# Patient Record
Sex: FEMALE | Race: WHITE | HISPANIC OR LATINO | Employment: PART TIME | URBAN - METROPOLITAN AREA
[De-identification: names, ages, dates, MRNs, and addresses within clinical notes are randomized per-mention and may not be internally consistent; named-entity substitution may affect disease eponyms.]

---

## 2018-11-02 ENCOUNTER — TRANSCRIBE ORDERS (OUTPATIENT)
Dept: LAB | Facility: CLINIC | Age: 23
End: 2018-11-02

## 2018-11-02 DIAGNOSIS — Z00.00 ROUTINE GENERAL MEDICAL EXAMINATION AT A HEALTH CARE FACILITY: Primary | ICD-10-CM

## 2019-07-26 ENCOUNTER — OFFICE VISIT (OUTPATIENT)
Dept: FAMILY MEDICINE CLINIC | Facility: CLINIC | Age: 24
End: 2019-07-26
Payer: COMMERCIAL

## 2019-07-26 VITALS
HEIGHT: 72 IN | RESPIRATION RATE: 14 BRPM | BODY MASS INDEX: 19.49 KG/M2 | HEART RATE: 76 BPM | TEMPERATURE: 98.2 F | OXYGEN SATURATION: 98 % | SYSTOLIC BLOOD PRESSURE: 128 MMHG | WEIGHT: 143.9 LBS | DIASTOLIC BLOOD PRESSURE: 78 MMHG

## 2019-07-26 DIAGNOSIS — Z23 NEED FOR TDAP VACCINATION: ICD-10-CM

## 2019-07-26 DIAGNOSIS — F90.9 ATTENTION DEFICIT HYPERACTIVITY DISORDER (ADHD), UNSPECIFIED ADHD TYPE: Primary | ICD-10-CM

## 2019-07-26 DIAGNOSIS — R11.0 NAUSEA: ICD-10-CM

## 2019-07-26 PROCEDURE — 99204 OFFICE O/P NEW MOD 45 MIN: CPT | Performed by: INTERNAL MEDICINE

## 2019-07-26 PROCEDURE — 4004F PT TOBACCO SCREEN RCVD TLK: CPT | Performed by: INTERNAL MEDICINE

## 2019-07-26 PROCEDURE — 3008F BODY MASS INDEX DOCD: CPT | Performed by: INTERNAL MEDICINE

## 2019-07-26 RX ORDER — ONDANSETRON 4 MG/1
4 TABLET, ORALLY DISINTEGRATING ORAL EVERY 6 HOURS PRN
Qty: 20 TABLET | Refills: 0 | Status: SHIPPED | OUTPATIENT
Start: 2019-07-26 | End: 2020-01-07 | Stop reason: SDUPTHER

## 2019-07-26 RX ORDER — CITALOPRAM 10 MG/1
TABLET ORAL
COMMUNITY
Start: 2019-04-24 | End: 2019-07-26

## 2019-07-26 RX ORDER — DEXTROAMPHETAMINE SACCHARATE, AMPHETAMINE ASPARTATE, DEXTROAMPHETAMINE SULFATE AND AMPHETAMINE SULFATE 2.5; 2.5; 2.5; 2.5 MG/1; MG/1; MG/1; MG/1
10 TABLET ORAL
COMMUNITY
Start: 2019-04-24 | End: 2020-01-07

## 2019-07-26 NOTE — PROGRESS NOTES
Assessment/Plan:         Diagnoses and all orders for this visit:    Attention deficit hyperactivity disorder (ADHD), unspecified ADHD type; Continue Adderall 10 mg bID with Food : breafast and 2 PM : 3 Rxs given for 3 mos, STAT ;  -     Comprehensive metabolic panel; Future  -     CBC and differential; Future  -     Hemoglobin A1C; Future  -     Magnesium; Future  -     Toxicology screen, urine  -     Lipid panel; Future  -     Thyroid Antibodies Panel; Future  -     T4, free; Future  -     TSH, 3rd generation; Future  -     Vitamin B12; Future  -     Vitamin D 25 hydroxy; Future  -     Urinalysis with reflex to microscopic  RTC in 3mos   Need for Tdap vaccination    Nausea; due to IBS and stress :  -     Comprehensive metabolic panel; Future  -     CBC and differential; Future  -     Hemoglobin A1C; Future  -     ondansetron (ZOFRAN-ODT) 4 mg disintegrating tablet; Take 1 tablet (4 mg total) by mouth every 6 (six) hours as needed for nausea or vomiting    Other orders  -     Cancel: TDAP VACCINE GREATER THAN OR EQUAL TO 8YO IM        Subjective:      Patient ID: Margo Suarez is a 20 y o  female  20 Y O lady is here for Annual Physical Exam, and Regular check up, No recent Blood work, she is doing very well on Adderall 10 mg BID, No Other issues except for On and Off IBS Symptoms and Nausea due to stress,     No Homicidal Nor Suicidal Ideas,    LMP;2 weeks ago    The following portions of the patient's history were reviewed and updated as appropriate: allergies, current medications, past family history, past medical history, past social history, past surgical history and problem list     Review of Systems   Constitutional: Negative for chills, fatigue and fever  HENT: Negative for congestion, facial swelling, sore throat, trouble swallowing and voice change  Eyes: Negative for pain, discharge and visual disturbance  Respiratory: Negative for cough, shortness of breath and wheezing  Cardiovascular: Negative for chest pain, palpitations and leg swelling  Gastrointestinal: Positive for nausea  Negative for abdominal pain, blood in stool, constipation and diarrhea  Endocrine: Negative for polydipsia, polyphagia and polyuria  Genitourinary: Negative for difficulty urinating, hematuria and urgency  Musculoskeletal: Negative for arthralgias and myalgias  Skin: Negative for rash  Neurological: Negative for dizziness, tremors, weakness and headaches  Hematological: Negative for adenopathy  Does not bruise/bleed easily  Psychiatric/Behavioral: Negative for dysphoric mood, sleep disturbance and suicidal ideas  Objective:      /78 (BP Location: Left arm, Patient Position: Sitting, Cuff Size: Standard)   Pulse 76   Temp 98 2 °F (36 8 °C) (Oral)   Resp 14   Ht 6' (1 829 m)   Wt 65 3 kg (143 lb 14 4 oz)   SpO2 98%   BMI 19 52 kg/m²          Physical Exam   Constitutional: She is oriented to person, place, and time  She appears well-nourished  No distress  HENT:   Head: Normocephalic  Mouth/Throat: Oropharynx is clear and moist  No oropharyngeal exudate  Eyes: Pupils are equal, round, and reactive to light  Conjunctivae are normal  No scleral icterus  Neck: Neck supple  No thyromegaly present  Cardiovascular: Normal rate, regular rhythm and normal heart sounds  No murmur heard  Pulmonary/Chest: Effort normal and breath sounds normal  No respiratory distress  She has no wheezes  She has no rales  Abdominal: Soft  Bowel sounds are normal  She exhibits no distension  There is no tenderness  There is no rebound and no guarding  Musculoskeletal: She exhibits no edema or tenderness  Lymphadenopathy:     She has no cervical adenopathy  Neurological: She is alert and oriented to person, place, and time  No cranial nerve deficit  Coordination normal    Skin: No erythema  Psychiatric: She has a normal mood and affect

## 2019-08-16 ENCOUNTER — APPOINTMENT (OUTPATIENT)
Dept: LAB | Facility: CLINIC | Age: 24
End: 2019-08-16
Payer: COMMERCIAL

## 2019-08-16 DIAGNOSIS — F90.9 ATTENTION DEFICIT HYPERACTIVITY DISORDER (ADHD), UNSPECIFIED ADHD TYPE: ICD-10-CM

## 2019-08-16 DIAGNOSIS — R11.0 NAUSEA: ICD-10-CM

## 2019-08-16 LAB
25(OH)D3 SERPL-MCNC: 24.2 NG/ML (ref 30–100)
ALBUMIN SERPL BCP-MCNC: 4.5 G/DL (ref 3.5–5)
ALP SERPL-CCNC: 38 U/L (ref 46–116)
ALT SERPL W P-5'-P-CCNC: 17 U/L (ref 12–78)
ANION GAP SERPL CALCULATED.3IONS-SCNC: 5 MMOL/L (ref 4–13)
AST SERPL W P-5'-P-CCNC: 9 U/L (ref 5–45)
BACTERIA UR QL AUTO: NORMAL /HPF
BASOPHILS # BLD AUTO: 0.04 THOUSANDS/ΜL (ref 0–0.1)
BASOPHILS NFR BLD AUTO: 1 % (ref 0–1)
BILIRUB SERPL-MCNC: 0.5 MG/DL (ref 0.2–1)
BILIRUB UR QL STRIP: NEGATIVE
BUN SERPL-MCNC: 15 MG/DL (ref 5–25)
CALCIUM SERPL-MCNC: 9 MG/DL (ref 8.3–10.1)
CHLORIDE SERPL-SCNC: 109 MMOL/L (ref 100–108)
CHOLEST SERPL-MCNC: 113 MG/DL (ref 50–200)
CLARITY UR: CLEAR
CO2 SERPL-SCNC: 29 MMOL/L (ref 21–32)
COLOR UR: YELLOW
CREAT SERPL-MCNC: 0.75 MG/DL (ref 0.6–1.3)
EOSINOPHIL # BLD AUTO: 0.29 THOUSAND/ΜL (ref 0–0.61)
EOSINOPHIL NFR BLD AUTO: 5 % (ref 0–6)
ERYTHROCYTE [DISTWIDTH] IN BLOOD BY AUTOMATED COUNT: 12.9 % (ref 11.6–15.1)
EST. AVERAGE GLUCOSE BLD GHB EST-MCNC: 97 MG/DL
GFR SERPL CREATININE-BSD FRML MDRD: 111 ML/MIN/1.73SQ M
GLUCOSE P FAST SERPL-MCNC: 80 MG/DL (ref 65–99)
GLUCOSE UR STRIP-MCNC: NEGATIVE MG/DL
HBA1C MFR BLD: 5 % (ref 4.2–6.3)
HCT VFR BLD AUTO: 40.4 % (ref 34.8–46.1)
HDLC SERPL-MCNC: 51 MG/DL (ref 40–60)
HGB BLD-MCNC: 12.9 G/DL (ref 11.5–15.4)
HGB UR QL STRIP.AUTO: NEGATIVE
HYALINE CASTS #/AREA URNS LPF: NORMAL /LPF
IMM GRANULOCYTES # BLD AUTO: 0 THOUSAND/UL (ref 0–0.2)
IMM GRANULOCYTES NFR BLD AUTO: 0 % (ref 0–2)
KETONES UR STRIP-MCNC: NEGATIVE MG/DL
LDLC SERPL CALC-MCNC: 56 MG/DL (ref 0–100)
LEUKOCYTE ESTERASE UR QL STRIP: NEGATIVE
LYMPHOCYTES # BLD AUTO: 2.4 THOUSANDS/ΜL (ref 0.6–4.47)
LYMPHOCYTES NFR BLD AUTO: 38 % (ref 14–44)
MAGNESIUM SERPL-MCNC: 2.5 MG/DL (ref 1.6–2.6)
MCH RBC QN AUTO: 30.3 PG (ref 26.8–34.3)
MCHC RBC AUTO-ENTMCNC: 31.9 G/DL (ref 31.4–37.4)
MCV RBC AUTO: 95 FL (ref 82–98)
MONOCYTES # BLD AUTO: 0.54 THOUSAND/ΜL (ref 0.17–1.22)
MONOCYTES NFR BLD AUTO: 9 % (ref 4–12)
NEUTROPHILS # BLD AUTO: 3.03 THOUSANDS/ΜL (ref 1.85–7.62)
NEUTS SEG NFR BLD AUTO: 47 % (ref 43–75)
NITRITE UR QL STRIP: NEGATIVE
NON-SQ EPI CELLS URNS QL MICRO: NORMAL /HPF
NONHDLC SERPL-MCNC: 62 MG/DL
NRBC BLD AUTO-RTO: 0 /100 WBCS
PH UR STRIP.AUTO: 7.5 [PH]
PLATELET # BLD AUTO: 191 THOUSANDS/UL (ref 149–390)
PMV BLD AUTO: 11.9 FL (ref 8.9–12.7)
POTASSIUM SERPL-SCNC: 4.2 MMOL/L (ref 3.5–5.3)
PROT SERPL-MCNC: 7.6 G/DL (ref 6.4–8.2)
PROT UR STRIP-MCNC: ABNORMAL MG/DL
RBC # BLD AUTO: 4.26 MILLION/UL (ref 3.81–5.12)
RBC #/AREA URNS AUTO: NORMAL /HPF
SODIUM SERPL-SCNC: 143 MMOL/L (ref 136–145)
SP GR UR STRIP.AUTO: 1.03 (ref 1–1.03)
T4 FREE SERPL-MCNC: 0.86 NG/DL (ref 0.76–1.46)
TRIGL SERPL-MCNC: 31 MG/DL
TSH SERPL DL<=0.05 MIU/L-ACNC: 0.91 UIU/ML (ref 0.36–3.74)
UROBILINOGEN UR QL STRIP.AUTO: 0.2 E.U./DL
VIT B12 SERPL-MCNC: 428 PG/ML (ref 100–900)
WBC # BLD AUTO: 6.3 THOUSAND/UL (ref 4.31–10.16)
WBC #/AREA URNS AUTO: NORMAL /HPF

## 2019-08-16 PROCEDURE — 82306 VITAMIN D 25 HYDROXY: CPT

## 2019-08-16 PROCEDURE — 84443 ASSAY THYROID STIM HORMONE: CPT

## 2019-08-16 PROCEDURE — 81001 URINALYSIS AUTO W/SCOPE: CPT | Performed by: INTERNAL MEDICINE

## 2019-08-16 PROCEDURE — 80061 LIPID PANEL: CPT

## 2019-08-16 PROCEDURE — 80307 DRUG TEST PRSMV CHEM ANLYZR: CPT | Performed by: INTERNAL MEDICINE

## 2019-08-16 PROCEDURE — 85025 COMPLETE CBC W/AUTO DIFF WBC: CPT

## 2019-08-16 PROCEDURE — 80053 COMPREHEN METABOLIC PANEL: CPT

## 2019-08-16 PROCEDURE — 84439 ASSAY OF FREE THYROXINE: CPT

## 2019-08-16 PROCEDURE — 82607 VITAMIN B-12: CPT

## 2019-08-16 PROCEDURE — 36415 COLL VENOUS BLD VENIPUNCTURE: CPT

## 2019-08-16 PROCEDURE — 83735 ASSAY OF MAGNESIUM: CPT

## 2019-08-16 PROCEDURE — 83036 HEMOGLOBIN GLYCOSYLATED A1C: CPT

## 2019-08-20 DIAGNOSIS — E55.9 VITAMIN D DEFICIENCY: Primary | ICD-10-CM

## 2019-08-20 DIAGNOSIS — R80.9 PROTEINURIA, UNSPECIFIED TYPE: Primary | ICD-10-CM

## 2019-08-20 DIAGNOSIS — E55.9 VITAMIN D DEFICIENCY: ICD-10-CM

## 2019-08-20 RX ORDER — ERGOCALCIFEROL 1.25 MG/1
50000 CAPSULE ORAL WEEKLY
Qty: 4 CAPSULE | Refills: 3 | Status: CANCELLED | OUTPATIENT
Start: 2019-08-20

## 2019-08-20 RX ORDER — ERGOCALCIFEROL 1.25 MG/1
50000 CAPSULE ORAL WEEKLY
Qty: 4 CAPSULE | Refills: 3 | Status: SHIPPED | OUTPATIENT
Start: 2019-08-20 | End: 2020-01-07 | Stop reason: SDUPTHER

## 2019-08-27 LAB
AMPHETAMINES UR QL SCN: NEGATIVE NG/ML
BARBITURATES UR QL SCN: NEGATIVE NG/ML
BENZODIAZ UR QL: NEGATIVE NG/ML
BZE UR QL: NEGATIVE NG/ML
CANNABINOIDS UR QL SCN: POSITIVE
METHADONE UR QL SCN: NEGATIVE NG/ML
OPIATES UR QL: NEGATIVE NG/ML
PCP UR QL: NEGATIVE NG/ML
PROPOXYPH UR QL SCN: NEGATIVE NG/ML

## 2019-11-12 DIAGNOSIS — F90.9 ATTENTION DEFICIT HYPERACTIVITY DISORDER (ADHD), UNSPECIFIED ADHD TYPE: Primary | ICD-10-CM

## 2019-11-13 RX ORDER — DEXTROAMPHETAMINE SACCHARATE, AMPHETAMINE ASPARTATE, DEXTROAMPHETAMINE SULFATE AND AMPHETAMINE SULFATE 2.5; 2.5; 2.5; 2.5 MG/1; MG/1; MG/1; MG/1
10 TABLET ORAL
Qty: 30 TABLET | Refills: 0 | OUTPATIENT
Start: 2019-11-13

## 2020-01-07 ENCOUNTER — OFFICE VISIT (OUTPATIENT)
Dept: FAMILY MEDICINE CLINIC | Facility: CLINIC | Age: 25
End: 2020-01-07
Payer: COMMERCIAL

## 2020-01-07 VITALS
SYSTOLIC BLOOD PRESSURE: 116 MMHG | RESPIRATION RATE: 14 BRPM | HEIGHT: 72 IN | TEMPERATURE: 98.2 F | OXYGEN SATURATION: 98 % | WEIGHT: 142 LBS | DIASTOLIC BLOOD PRESSURE: 72 MMHG | BODY MASS INDEX: 19.23 KG/M2 | HEART RATE: 82 BPM

## 2020-01-07 DIAGNOSIS — Z00.01 ENCOUNTER FOR GENERAL ADULT MEDICAL EXAMINATION WITH ABNORMAL FINDINGS: Primary | ICD-10-CM

## 2020-01-07 DIAGNOSIS — R11.0 NAUSEA: ICD-10-CM

## 2020-01-07 DIAGNOSIS — F98.8 ATTENTION DEFICIT DISORDER, UNSPECIFIED HYPERACTIVITY PRESENCE: ICD-10-CM

## 2020-01-07 DIAGNOSIS — R00.2 PALPITATIONS: ICD-10-CM

## 2020-01-07 DIAGNOSIS — J34.2 DEVIATED SEPTUM: ICD-10-CM

## 2020-01-07 DIAGNOSIS — Z11.4 SCREENING FOR HUMAN IMMUNODEFICIENCY VIRUS: ICD-10-CM

## 2020-01-07 DIAGNOSIS — E55.9 VITAMIN D DEFICIENCY: ICD-10-CM

## 2020-01-07 PROCEDURE — 93000 ELECTROCARDIOGRAM COMPLETE: CPT | Performed by: INTERNAL MEDICINE

## 2020-01-07 PROCEDURE — 99395 PREV VISIT EST AGE 18-39: CPT | Performed by: INTERNAL MEDICINE

## 2020-01-07 PROCEDURE — 3008F BODY MASS INDEX DOCD: CPT | Performed by: INTERNAL MEDICINE

## 2020-01-07 PROCEDURE — 99214 OFFICE O/P EST MOD 30 MIN: CPT | Performed by: INTERNAL MEDICINE

## 2020-01-07 RX ORDER — DEXTROAMPHETAMINE SACCHARATE, AMPHETAMINE ASPARTATE, DEXTROAMPHETAMINE SULFATE AND AMPHETAMINE SULFATE 1.25; 1.25; 1.25; 1.25 MG/1; MG/1; MG/1; MG/1
5 TABLET ORAL 2 TIMES DAILY
Qty: 60 TABLET | Refills: 0 | Status: SHIPPED | OUTPATIENT
Start: 2020-01-07 | End: 2020-01-24

## 2020-01-07 RX ORDER — ERGOCALCIFEROL 1.25 MG/1
50000 CAPSULE ORAL WEEKLY
Qty: 4 CAPSULE | Refills: 3 | Status: SHIPPED | OUTPATIENT
Start: 2020-01-07 | End: 2020-06-15 | Stop reason: SDUPTHER

## 2020-01-07 RX ORDER — ONDANSETRON 4 MG/1
4 TABLET, ORALLY DISINTEGRATING ORAL EVERY 6 HOURS PRN
Qty: 20 TABLET | Refills: 0 | Status: SHIPPED | OUTPATIENT
Start: 2020-01-07 | End: 2020-01-24

## 2020-01-08 NOTE — PROGRESS NOTES
Assessment/Plan:         Diagnoses and all orders for this visit:    Encounter for general adult medical examination with abnormal findings : done in Detail  RTC in 1-2 mos w :  -     POCT ECG  -     Thyroid Antibodies Panel; Future  -     T4, free; Future  -     TSH, 3rd generation; Future  -     Comprehensive metabolic panel; Future  -     UA (URINE) with reflex to Scope    Screening for human immunodeficiency virus  -     Human Immunodeficiency Virus 1/2 Antigen / Antibody ( Fourth Generation) with Reflex Testing; Future    Vitamin D deficiency; renew :  -     ergocalciferol (VITAMIN D2) 50,000 units; Take 1 capsule (50,000 Units total) by mouth once a week    Nausea; FU w GI  Renew :  -     ondansetron (ZOFRAN-ODT) 4 mg disintegrating tablet; Take 1 tablet (4 mg total) by mouth every 6 (six) hours as needed for nausea or vomiting 20/0  RTC in 1-2 mos w :  -     Comprehensive metabolic panel; Future  -     UA (URINE) with reflex to Scope    Attention deficit disorder, unspecified hyperactivity presence;  -     POCT ECG  -     Thyroid Antibodies Panel; Future  -     T4, free; Future  -     TSH, 3rd generation; Future  -     US thyroid; Future  -     Echo complete with contrast if indicated; Future  -     Comprehensive metabolic panel; Future  -     UA (URINE) with reflex to Scope  TRY :  -     amphetamine-dextroamphetamine (ADDERALL) 5 MG tablet; Take 1 tablet (5 mg total) by mouth 2 (two) times a dayMax Daily Amount: 10 mg 60/0  RTC in 1-2 mos w :  -     Echo complete with contrast if indicated; Future    Deviated septum  -     Ambulatory Referral to Otolaryngology; Future    Low weight, pediatric, BMI less than 5th percentile for age  -     Ambulatory referral to Nutrition Services; Future         Subjective:      Patient ID: Destin Walden is a 30 y o  female      [address] [place] [address] is here for Annual Physical Exam and regular Check up, no recent blood work, med list reviewed w pt, she has few symptoms as per R O S ,including ADHD , No Homicidal nor suicidal Ideas    The following portions of the patient's history were reviewed and updated as appropriate: allergies, current medications, past medical history, past social history, past surgical history and problem list     Review of Systems   Constitutional: Positive for fatigue  Negative for chills and fever  HENT: Negative for congestion, facial swelling, sore throat, trouble swallowing and voice change  Eyes: Negative for pain, discharge and visual disturbance  Respiratory: Negative for cough, shortness of breath and wheezing  Cardiovascular: Negative for chest pain, palpitations and leg swelling  Gastrointestinal: Negative for abdominal pain, blood in stool, constipation, diarrhea and nausea  Endocrine: Negative for polydipsia, polyphagia and polyuria  Genitourinary: Negative for difficulty urinating, hematuria and urgency  Musculoskeletal: Negative for arthralgias and myalgias  Skin: Negative for rash  Neurological: Negative for dizziness, tremors, weakness and headaches  Hematological: Negative for adenopathy  Does not bruise/bleed easily  Psychiatric/Behavioral: Negative for dysphoric mood, sleep disturbance and suicidal ideas  Objective:      /72 (BP Location: Left arm, Patient Position: Sitting, Cuff Size: Standard)   Pulse 82   Temp 98 2 °F (36 8 °C) (Probe)   Resp 14   Ht 6' (1 829 m)   Wt 64 4 kg (142 lb)   LMP 12/19/2019 (Approximate)   SpO2 98%   BMI 19 26 kg/m²          Physical Exam   Constitutional: She is oriented to person, place, and time  She appears well-nourished  No distress  HENT:   Head: Normocephalic  Mouth/Throat: Oropharynx is clear and moist  No oropharyngeal exudate  Eyes: Pupils are equal, round, and reactive to light  Conjunctivae are normal  No scleral icterus  Neck: Neck supple  No thyromegaly present  Cardiovascular: Normal rate, regular rhythm and normal heart sounds     No murmur heard  Pulmonary/Chest: Effort normal and breath sounds normal  No respiratory distress  She has no wheezes  She has no rales  Abdominal: Soft  Bowel sounds are normal  She exhibits no distension  There is no tenderness  There is no rebound and no guarding  Musculoskeletal: She exhibits no edema or tenderness  Lymphadenopathy:     She has no cervical adenopathy  Neurological: She is alert and oriented to person, place, and time  No cranial nerve deficit  Coordination normal    Skin: No rash noted  No erythema  Psychiatric: She has a normal mood and affect

## 2020-01-23 ENCOUNTER — OFFICE VISIT (OUTPATIENT)
Dept: URGENT CARE | Age: 25
End: 2020-01-23
Payer: COMMERCIAL

## 2020-01-23 VITALS
WEIGHT: 135 LBS | HEIGHT: 70 IN | SYSTOLIC BLOOD PRESSURE: 126 MMHG | RESPIRATION RATE: 16 BRPM | DIASTOLIC BLOOD PRESSURE: 74 MMHG | TEMPERATURE: 98.5 F | BODY MASS INDEX: 19.33 KG/M2 | OXYGEN SATURATION: 98 % | HEART RATE: 83 BPM

## 2020-01-23 DIAGNOSIS — H65.91 RIGHT NON-SUPPURATIVE OTITIS MEDIA: Primary | ICD-10-CM

## 2020-01-23 DIAGNOSIS — J01.00 ACUTE NON-RECURRENT MAXILLARY SINUSITIS: ICD-10-CM

## 2020-01-23 PROCEDURE — 99213 OFFICE O/P EST LOW 20 MIN: CPT | Performed by: PHYSICIAN ASSISTANT

## 2020-01-23 RX ORDER — ACETAMINOPHEN 325 MG/1
650 TABLET ORAL EVERY 6 HOURS PRN
COMMUNITY

## 2020-01-23 RX ORDER — AMOXICILLIN AND CLAVULANATE POTASSIUM 875; 125 MG/1; MG/1
1 TABLET, FILM COATED ORAL EVERY 12 HOURS SCHEDULED
Qty: 20 TABLET | Refills: 0 | Status: SHIPPED | OUTPATIENT
Start: 2020-01-23 | End: 2020-01-24

## 2020-01-23 NOTE — PATIENT INSTRUCTIONS
-start antibiotics as directed  -over-the-counter Tylenol and ibuprofen as needed for pain and fevers  -drink plenty of fluids  -take probiotics while on antibiotics  -follow-up with the primary care doctor in 3-5 days  -ER symptoms worsen

## 2020-01-23 NOTE — PROGRESS NOTES
[facility]        NAME: Andrew Marcum is a 30 y o  female  : 1995    MRN: [id number]  DATE: 2020  TIME: 5:53 PM    Assessment and Plan   Right non-suppurative otitis media [H65 91]  1  Right non-suppurative otitis media  amoxicillin-clavulanate (AUGMENTIN) 875-125 mg per tablet   2  Acute non-recurrent maxillary sinusitis  amoxicillin-clavulanate (AUGMENTIN) 875-125 mg per tablet         Patient Instructions     -start antibiotics as directed  -over-the-counter Tylenol and ibuprofen as needed for pain and fevers  -drink plenty of fluids  -take probiotics while on antibiotics  Follow up with PCP in 3-5 days  Proceed to  ER if symptoms worsen  Chief Complaint     Chief Complaint   Patient presents with    Cold Like Symptoms     started on mon    Nasal Congestion    Headache    Fatigue    Eye Pain    Cough         History of Present Illness       Patient started with sinus pain and pressure in headache since Monday  She is not having right ear pain  She denies a sore throat or cough  She denies any fevers but has been having chills  Review of Systems   Review of Systems   Constitutional: Positive for chills and fatigue  Negative for fever  HENT: Positive for congestion, ear pain, sinus pressure and sinus pain  Negative for sore throat  Respiratory: Negative  Cardiovascular: Negative  Gastrointestinal: Negative  Musculoskeletal: Negative  Neurological: Negative  Psychiatric/Behavioral: Negative            Current Medications       Current Outpatient Medications:     acetaminophen (TYLENOL) 325 mg tablet, Take 650 mg by mouth every 6 (six) hours as needed for mild pain, Disp: , Rfl:     amphetamine-dextroamphetamine (ADDERALL) 5 MG tablet, Take 1 tablet (5 mg total) by mouth 2 (two) times a dayMax Daily Amount: 10 mg, Disp: 60 tablet, Rfl: 0    ergocalciferol (VITAMIN D2) 50,000 units, Take 1 capsule (50,000 Units total) by mouth once a week, Disp: 4 capsule, Rfl: 3    Pseudoephedrine-APAP-DM (DAYQUIL PO), Take by mouth, Disp: , Rfl:     Pseudoephedrine-guaiFENesin (MUCINEX D PO), Take by mouth, Disp: , Rfl:     amoxicillin-clavulanate (AUGMENTIN) 875-125 mg per tablet, Take 1 tablet by mouth every 12 (twelve) hours for 10 days, Disp: 20 tablet, Rfl: 0    ondansetron (ZOFRAN-ODT) 4 mg disintegrating tablet, Take 1 tablet (4 mg total) by mouth every 6 (six) hours as needed for nausea or vomiting (Patient not taking: Reported on 1/23/2020), Disp: 20 tablet, Rfl: 0    Current Allergies     Allergies as of 01/23/2020    (No Known Allergies)            The following portions of the patient's history were reviewed and updated as appropriate: allergies, current medications, past family history, past medical history, past social history, past surgical history and problem list      History reviewed  No pertinent past medical history  History reviewed  No pertinent surgical history  Family History   Problem Relation Age of Onset    No Known Problems Mother     No Known Problems Father          Medications have been verified  Objective   /74   Pulse 83   Temp 98 5 °F (36 9 °C)   Resp 16   Ht 5' 10" (1 778 m)   Wt 61 2 kg (135 lb)   SpO2 98%   BMI 19 37 kg/m²        Physical Exam     Physical Exam   Constitutional: She is oriented to person, place, and time  She appears well-developed and well-nourished  No distress  HENT:   Head: Normocephalic and atraumatic  Right Ear: External ear normal    Left Ear: External ear normal    Nose: Nose normal    Mouth/Throat: Oropharynx is clear and moist  No oropharyngeal exudate  Right tympanic membrane with erythema and bulging  Left tympanic membrane normal  Tenderness palpation over sinuses   Cardiovascular: Normal rate, regular rhythm and normal heart sounds  Pulmonary/Chest: Effort normal and breath sounds normal    Neurological: She is alert and oriented to person, place, and time  Skin: Skin is warm and dry  She is not diaphoretic  Psychiatric: She has a normal mood and affect  Her behavior is normal    Nursing note and vitals reviewed

## 2020-01-23 NOTE — LETTER
January 23, 2020     Patient: Lavon Abad   YOB: 1995   Date of Visit: 1/23/2020       To Whom it May Concern:    Ramona Harvinder was seen in my clinic on 1/23/2020  Please excuse from classes on 1/22-1/24  She may return to school on 1/27  If you have any questions or concerns, please don't hesitate to call           Sincerely,          [facility]        CC: No Recipients

## 2020-01-24 DIAGNOSIS — F98.8 ATTENTION DEFICIT DISORDER, UNSPECIFIED HYPERACTIVITY PRESENCE: Primary | ICD-10-CM

## 2020-01-24 RX ORDER — DEXTROAMPHETAMINE SACCHARATE, AMPHETAMINE ASPARTATE, DEXTROAMPHETAMINE SULFATE AND AMPHETAMINE SULFATE 2.5; 2.5; 2.5; 2.5 MG/1; MG/1; MG/1; MG/1
10 TABLET ORAL DAILY
Qty: 30 TABLET | Refills: 0 | Status: SHIPPED | OUTPATIENT
Start: 2020-01-24 | End: 2020-06-15 | Stop reason: SDUPTHER

## 2020-03-09 ENCOUNTER — HOSPITAL ENCOUNTER (OUTPATIENT)
Dept: NON INVASIVE DIAGNOSTICS | Facility: CLINIC | Age: 25
Discharge: HOME | End: 2020-03-09
Payer: COMMERCIAL

## 2020-03-09 DIAGNOSIS — R00.2 PALPITATIONS: ICD-10-CM

## 2020-03-09 DIAGNOSIS — F98.8 ATTENTION DEFICIT DISORDER, UNSPECIFIED HYPERACTIVITY PRESENCE: ICD-10-CM

## 2020-03-09 PROCEDURE — 93306 TTE W/DOPPLER COMPLETE: CPT

## 2020-03-09 PROCEDURE — 93306 TTE W/DOPPLER COMPLETE: CPT | Performed by: INTERNAL MEDICINE

## 2020-03-16 ENCOUNTER — HOSPITAL ENCOUNTER (OUTPATIENT)
Dept: ULTRASOUND IMAGING | Facility: CLINIC | Age: 25
Discharge: HOME | End: 2020-03-16
Payer: COMMERCIAL

## 2020-03-16 DIAGNOSIS — F98.8 ATTENTION DEFICIT DISORDER, UNSPECIFIED HYPERACTIVITY PRESENCE: ICD-10-CM

## 2020-03-16 DIAGNOSIS — R00.2 PALPITATIONS: ICD-10-CM

## 2020-03-16 PROCEDURE — 76536 US EXAM OF HEAD AND NECK: CPT

## 2020-06-15 ENCOUNTER — OFFICE VISIT (OUTPATIENT)
Dept: FAMILY MEDICINE CLINIC | Facility: CLINIC | Age: 25
End: 2020-06-15
Payer: COMMERCIAL

## 2020-06-15 VITALS
HEART RATE: 84 BPM | OXYGEN SATURATION: 98 % | HEIGHT: 70 IN | DIASTOLIC BLOOD PRESSURE: 78 MMHG | SYSTOLIC BLOOD PRESSURE: 118 MMHG | RESPIRATION RATE: 14 BRPM | BODY MASS INDEX: 21.07 KG/M2 | WEIGHT: 147.2 LBS | TEMPERATURE: 97.6 F

## 2020-06-15 DIAGNOSIS — F17.210 CIGARETTE SMOKER: ICD-10-CM

## 2020-06-15 DIAGNOSIS — E55.9 VITAMIN D DEFICIENCY: ICD-10-CM

## 2020-06-15 DIAGNOSIS — Z12.4 CERVICAL CANCER SCREENING: ICD-10-CM

## 2020-06-15 DIAGNOSIS — B35.1 ONYCHOMYCOSIS OF GREAT TOE: ICD-10-CM

## 2020-06-15 DIAGNOSIS — Z11.4 SCREENING FOR HIV (HUMAN IMMUNODEFICIENCY VIRUS): ICD-10-CM

## 2020-06-15 DIAGNOSIS — F98.8 ATTENTION DEFICIT DISORDER, UNSPECIFIED HYPERACTIVITY PRESENCE: Primary | ICD-10-CM

## 2020-06-15 DIAGNOSIS — F41.9 ANXIETY: ICD-10-CM

## 2020-06-15 PROCEDURE — 4004F PT TOBACCO SCREEN RCVD TLK: CPT | Performed by: INTERNAL MEDICINE

## 2020-06-15 PROCEDURE — 99214 OFFICE O/P EST MOD 30 MIN: CPT | Performed by: INTERNAL MEDICINE

## 2020-06-15 PROCEDURE — 3008F BODY MASS INDEX DOCD: CPT | Performed by: INTERNAL MEDICINE

## 2020-06-15 PROCEDURE — 3008F BODY MASS INDEX DOCD: CPT | Performed by: NURSE PRACTITIONER

## 2020-06-15 RX ORDER — CLOTRIMAZOLE 1 %
CREAM (GRAM) TOPICAL 2 TIMES DAILY
Qty: 30 G | Refills: 0 | Status: SHIPPED | OUTPATIENT
Start: 2020-06-15 | End: 2020-11-19

## 2020-06-15 RX ORDER — FLUCONAZOLE 100 MG/1
100 TABLET ORAL WEEKLY
Qty: 4 TABLET | Refills: 5 | Status: SHIPPED | OUTPATIENT
Start: 2020-06-15 | End: 2020-11-19

## 2020-06-15 RX ORDER — ERGOCALCIFEROL 1.25 MG/1
50000 CAPSULE ORAL WEEKLY
Qty: 4 CAPSULE | Refills: 3 | Status: SHIPPED | OUTPATIENT
Start: 2020-06-15 | End: 2021-01-14

## 2020-06-15 RX ORDER — DEXTROAMPHETAMINE SACCHARATE, AMPHETAMINE ASPARTATE, DEXTROAMPHETAMINE SULFATE AND AMPHETAMINE SULFATE 2.5; 2.5; 2.5; 2.5 MG/1; MG/1; MG/1; MG/1
10 TABLET ORAL DAILY
Qty: 30 TABLET | Refills: 0 | Status: SHIPPED | OUTPATIENT
Start: 2020-06-15 | End: 2020-06-18 | Stop reason: SDUPTHER

## 2020-06-16 ENCOUNTER — TELEPHONE (OUTPATIENT)
Dept: PSYCHIATRY | Facility: CLINIC | Age: 25
End: 2020-06-16

## 2020-06-18 ENCOUNTER — TELEMEDICINE (OUTPATIENT)
Dept: PSYCHIATRY | Facility: CLINIC | Age: 25
End: 2020-06-18
Payer: COMMERCIAL

## 2020-06-18 DIAGNOSIS — F41.9 ANXIETY: ICD-10-CM

## 2020-06-18 DIAGNOSIS — F33.1 MDD (MAJOR DEPRESSIVE DISORDER), RECURRENT EPISODE, MODERATE (HCC): ICD-10-CM

## 2020-06-18 DIAGNOSIS — F98.8 ATTENTION DEFICIT DISORDER, UNSPECIFIED HYPERACTIVITY PRESENCE: Primary | ICD-10-CM

## 2020-06-18 PROBLEM — F90.8 ADHD, ADULT RESIDUAL TYPE: Status: ACTIVE | Noted: 2020-06-18

## 2020-06-18 PROCEDURE — 99244 OFF/OP CNSLTJ NEW/EST MOD 40: CPT | Performed by: NURSE PRACTITIONER

## 2020-06-18 RX ORDER — ESCITALOPRAM OXALATE 5 MG/1
5 TABLET ORAL DAILY
Qty: 30 TABLET | Refills: 1 | Status: SHIPPED | OUTPATIENT
Start: 2020-06-18 | End: 2020-11-19

## 2020-06-18 RX ORDER — DEXTROAMPHETAMINE SACCHARATE, AMPHETAMINE ASPARTATE, DEXTROAMPHETAMINE SULFATE AND AMPHETAMINE SULFATE 2.5; 2.5; 2.5; 2.5 MG/1; MG/1; MG/1; MG/1
10 TABLET ORAL DAILY
Qty: 30 TABLET | Refills: 0 | Status: SHIPPED | OUTPATIENT
Start: 2020-06-18 | End: 2020-11-19

## 2020-06-25 ENCOUNTER — TELEMEDICINE (OUTPATIENT)
Dept: BEHAVIORAL/MENTAL HEALTH CLINIC | Facility: CLINIC | Age: 25
End: 2020-06-25
Payer: COMMERCIAL

## 2020-06-25 DIAGNOSIS — F90.8 ADHD, ADULT RESIDUAL TYPE: Primary | ICD-10-CM

## 2020-06-25 DIAGNOSIS — F33.1 MDD (MAJOR DEPRESSIVE DISORDER), RECURRENT EPISODE, MODERATE (HCC): ICD-10-CM

## 2020-06-25 DIAGNOSIS — F41.0 PANIC DISORDER WITHOUT AGORAPHOBIA: ICD-10-CM

## 2020-06-25 PROCEDURE — 90834 PSYTX W PT 45 MINUTES: CPT | Performed by: SOCIAL WORKER

## 2020-07-15 ENCOUNTER — TELEMEDICINE (OUTPATIENT)
Dept: BEHAVIORAL/MENTAL HEALTH CLINIC | Facility: CLINIC | Age: 25
End: 2020-07-15
Payer: COMMERCIAL

## 2020-07-15 DIAGNOSIS — F90.8 ADHD, ADULT RESIDUAL TYPE: ICD-10-CM

## 2020-07-15 DIAGNOSIS — F33.1 MDD (MAJOR DEPRESSIVE DISORDER), RECURRENT EPISODE, MODERATE (HCC): Primary | ICD-10-CM

## 2020-07-15 PROCEDURE — 90834 PSYTX W PT 45 MINUTES: CPT | Performed by: SOCIAL WORKER

## 2020-07-15 NOTE — PSYCH
Virtual Regular Visit      Assessment/Plan:    Problem List Items Addressed This Visit        Other    ADHD, adult residual type    MDD (major depressive disorder), recurrent episode, moderate ([facility] [address] ) - Primary               Reason for visit is   Chief Complaint   Patient presents with    Virtual Regular Visit        Encounter provider OLIVIER Gabriel    Provider located at [address] [place] [address] [address]  [facility] [address]  [phone]      Recent Visits  No visits were found meeting these conditions  Showing recent visits within past 7 days and meeting all other requirements     Today's Visits  Date Type Provider Dept   07/15/20 Telemedicine OLIVIER Gabriel [facility] Therapist [place]   Showing today's visits and meeting all other requirements     Future Appointments  No visits were found meeting these conditions  Showing future appointments within next 150 days and meeting all other requirements        The patient was identified by name and date of birth  Joeseph Skiff was informed that this is a telemedicine visit and that the visit is being conducted through [employer]  My office door was closed  No one else was in the room  She acknowledged consent and understanding of privacy and security of the video platform  The patient has agreed to participate and understands they can discontinue the visit at any time  Patient is aware this is a billable service  Arlin Wells is a 30 y o  female    HPI     No past medical history on file  No past surgical history on file      Current Outpatient Medications   Medication Sig Dispense Refill    acetaminophen (TYLENOL) 325 mg tablet Take 650 mg by mouth every 6 (six) hours as needed for mild pain      amphetamine-dextroamphetamine (ADDERALL) 10 mg tablet Take 1 tablet (10 mg total) by mouth daily With Breakfast Max Daily Amount: 10 mg 30 tablet 0    clotrimazole (LOTRIMIN) 1 % cream Apply topically 2 (two) times a day 30 g 0    ergocalciferol (VITAMIN D2) 50,000 units Take 1 capsule (50,000 Units total) by mouth once a week 4 capsule 3    escitalopram (LEXAPRO) 5 mg tablet Take 1 tablet (5 mg total) by mouth daily 30 tablet 1    fluconazole (DIFLUCAN) 100 mg tablet Take 1 tablet (100 mg total) by mouth once a week With Food/Lunch 4 tablet 5     No current facility-administered medications for this visit  No Known Allergies    Review of Systems    Video Exam    There were no vitals filed for this visit  Physical Exam     I spent 45 minutes directly with the patient during this visit    Data: [facility] that she is drinking very infrequently, and also very infrequent use of marijuana since our last session  She stated that she had had a sexual relationship with her roommate, and now she is trying to decide whether the relationship is incompatible  Also she decided to go on a 2 week vacation to [place] with her friend up and see a friend of hers  She also states that her sister has even worse depression than she used to  There was a major argument between Janiya Rodríguez and her mother, and also a major argument between Janiya Rodríguez and her sister  Homework:  When upset or stressed, take a deep breath (from the diaphragm), then ask yourself - what is the healthy thing to do right now?      Living in the Present - Three things we can control   What I say   What I do   Where I am   We can control these things for about five minutes     [place] [facility] ([facility] is [facility])   There are four phrases:   May they be happy   May they be safe   May be healthy   May their lives be at ease   Then the [employer] is said five sets   1- for yourself   2- for a benefactor/ someone you care about/ loved one   3- for a neutral party   4- for someone you are having difficulty with/ enemy   5 - for all living beings   I am hoping this might help with some of the strong feelings you described  Remember, the karma someone else has - they still have   You being angry at them - neither hurts them nor helps them - it only hurts you      Assessment: Yandeljimmy Marquez seems to be making significant progress  She when above and beyond in following the homework assigned from last session    Plan:  Hopefully will be able to meet with her weekly upon return from her vacation    [id number] [facility] acknowledges that she has consented to an online visit or consultation  She understands that the online visit is based solely on information provided by her, and that, in the absence of a face-to-face physical evaluation by the physician, the diagnosis she receives is both limited and provisional in terms of accuracy and completeness  This is not intended to replace a full medical face-to-face evaluation by the physician  Yandel Marquez understands and accepts these terms

## 2020-07-15 NOTE — BH TREATMENT PLAN
Trang Jaeger  1995     Treatment Plan done but not signed at time of office visit due to:  Plan reviewed  by video and verbal consent given due to [address] distancing    Date of Initial Treatment Plan: 7/15/2020   Date of Current Treatment Plan: 07/15/20    Treatment Plan Number 1     Strengths/Personal Resources for Self Care: education, family support    Diagnosis:   1  MDD (major depressive disorder), recurrent episode, moderate ([address] )     2  ADHD, adult residual type         Area of Needs: depression      Long Term Goal 1: A - I do not want to be depressed anymore  I would like to work on my anger as well    Target Date: 01/15/2021  Completion Date: n/a         Short Term Objectives for Goal 1:    Adult ADD-Minimize ADD behavior interference in daily life  1) Take medications as directed  2) Educate on symptoms of ADD  3) Identify specific behaviors that cause most difficulty  4) Apply problem solving skills to specific ADD behaviors  5) Utilize cognitive strategies to curb impulses  6) Implement relaxation techniques to reduce stress and increase frustration tolerance  Depression- Develop healthy cognitive patterns and beliefs of self and the world that lead to alleviation and help prevent the relapse of depression symptoms  1) Identify and verbalize triggers to depression symptoms  2) Complete medication evaluation, take medication as directed  3) Utilize behavioral strategies to overcome depression  4) Identify and replace cognitive self talk that supports low mood  5) Express feelings associated with depression  6) Improve communication skills with significant others in life  7) Verbalize understanding between unhealthy behaviors and feelings  8) Identify what is missing from life that is causing unhappiness  9) Process feelings regarding situations that are out of ones control and verbalize acceptance of them    10) Initiate and respond positively to social communication with supportive others  11) Identify and express emotional needs to others  12) Adopt healthy living habits with sleep and nutrition  13) Disengage from address negative feelings with unhealthy coping skills    GOAL 1: Modality: Individual 2-4x per month   Completion Date n/a    GOAL 2: Modality: Medication Management     GOAL 3: Modality: The person(s) responsible for carrying out the plan is  Dr Cheri De La Garza, and [id number]: Diagnosis and Treatment Plan explained to Lexi Argelia relates understanding diagnosis and is agreeable to Treatment Plan         Client Comments : Please share your thoughts, feelings, need and/or experiences regarding your treatment plan: none

## 2020-07-29 ENCOUNTER — TELEMEDICINE (OUTPATIENT)
Dept: BEHAVIORAL/MENTAL HEALTH CLINIC | Facility: CLINIC | Age: 25
End: 2020-07-29
Payer: COMMERCIAL

## 2020-07-29 DIAGNOSIS — F41.0 PANIC DISORDER WITHOUT AGORAPHOBIA: ICD-10-CM

## 2020-07-29 DIAGNOSIS — F33.1 MDD (MAJOR DEPRESSIVE DISORDER), RECURRENT EPISODE, MODERATE (HCC): Primary | ICD-10-CM

## 2020-07-29 DIAGNOSIS — F90.8 ADHD, ADULT RESIDUAL TYPE: ICD-10-CM

## 2020-07-29 PROCEDURE — 90834 PSYTX W PT 45 MINUTES: CPT | Performed by: SOCIAL WORKER

## 2020-07-29 NOTE — PSYCH
Virtual Regular Visit      Assessment/Plan:    Problem List Items Addressed This Visit        Other    ADHD, adult residual type    MDD (major depressive disorder), recurrent episode, moderate (HCC) - Primary    Panic disorder without agoraphobia               Reason for visit is No chief complaint on file  Encounter provider OLIVIER Gabriel    Provider located at [address] [place] [address] [address]  [facility] [address] [phone]      Recent Visits  No visits were found meeting these conditions  Showing recent visits within past 7 days and meeting all other requirements     Today's Visits  Date Type Provider Dept   07/29/20 Telemedicine OLIVIER Gabriel [facility] Therapist [facility]   Showing today's visits and meeting all other requirements     Future Appointments  No visits were found meeting these conditions  Showing future appointments within next 150 days and meeting all other requirements        The patient was identified by name and date of birth  Joeseph Skiff was informed that this is a telemedicine visit and that the visit is being conducted through [employer]  My office door was closed  No one else was in the room  She acknowledged consent and understanding of privacy and security of the video platform  The patient has agreed to participate and understands they can discontinue the visit at any time  Patient is aware this is a billable service  Arlin Wells is a 30 y o  female    HPI     No past medical history on file  No past surgical history on file      Current Outpatient Medications   Medication Sig Dispense Refill    acetaminophen (TYLENOL) 325 mg tablet Take 650 mg by mouth every 6 (six) hours as needed for mild pain      amphetamine-dextroamphetamine (ADDERALL) 10 mg tablet Take 1 tablet (10 mg total) by mouth daily With Breakfast Max Daily Amount: 10 mg 30 tablet 0    clotrimazole (LOTRIMIN) 1 % cream Apply topically 2 (two) times a day 30 g 0    ergocalciferol (VITAMIN D2) 50,000 units Take 1 capsule (50,000 Units total) by mouth once a week 4 capsule 3    escitalopram (LEXAPRO) 5 mg tablet Take 1 tablet (5 mg total) by mouth daily 30 tablet 1    fluconazole (DIFLUCAN) 100 mg tablet Take 1 tablet (100 mg total) by mouth once a week With Food/Lunch 4 tablet 5     No current facility-administered medications for this visit  No Known Allergies    Review of Systems    Video Exam    There were no vitals filed for this visit  Physical Exam     I spent 45 minutes directly with the patient during this visit    Data: Jack Otoniel stated that she was suffering or struggling since she came back from vacation  It took her about a week to not be as anxious as she was when she left  However she stated that she never really got to calm either  There is also family discussion how they all seem to be struggling with anxiety and depression  Most them did not want to admit this, perhaps out of a feeling of failure  It was not clear  Discussed mind fullness based stress reduction and offered resources to do that  Discussed how to handle decisions for her life and suggested using the question is this healthy  Introduced the most general version of how to heal from depression    Homework:  www [employer]care     When considering what to do next, consider the questions:   Is this healthy? Is this helpful? The best "cure" for depression is three-fold:   1- self-care - learning how to take care of ourselves   2- how we handle stress - stress management   3- spirituality: I am not talking about Sabianism  Mandaen can feed this (if that is your thing)  but it is not the same thing  This is more about the life goals/ dreams/ all the stuff that is bigger than us  This is mildly inspired by [employer]   Depression is the force sucking you down  Spirituality would be the equal and opposite (or even stronger) force moving in the opposite direction  Assessment: Emelina Ku seems to be making significant progress    Plan:  Next office visit discuss Romina Hawkins, finding her heels (dealing with anxiety)    VIRTUAL VISIT [address] acknowledges that she has consented to an online visit or consultation  She understands that the online visit is based solely on information provided by her, and that, in the absence of a face-to-face physical evaluation by the physician, the diagnosis she receives is both limited and provisional in terms of accuracy and completeness  This is not intended to replace a full medical face-to-face evaluation by the physician  Emelina Ku understands and accepts these terms

## 2020-08-11 ENCOUNTER — TELEMEDICINE (OUTPATIENT)
Dept: BEHAVIORAL/MENTAL HEALTH CLINIC | Facility: CLINIC | Age: 25
End: 2020-08-11
Payer: COMMERCIAL

## 2020-08-11 DIAGNOSIS — F33.1 MDD (MAJOR DEPRESSIVE DISORDER), RECURRENT EPISODE, MODERATE (HCC): Primary | ICD-10-CM

## 2020-08-11 DIAGNOSIS — F90.8 ADHD, ADULT RESIDUAL TYPE: ICD-10-CM

## 2020-08-11 DIAGNOSIS — F41.0 PANIC DISORDER WITHOUT AGORAPHOBIA: ICD-10-CM

## 2020-08-11 PROCEDURE — 90834 PSYTX W PT 45 MINUTES: CPT | Performed by: SOCIAL WORKER

## 2020-08-11 NOTE — PSYCH
Virtual Regular Visit      Assessment/Plan:    Problem List Items Addressed This Visit        Other    ADHD, adult residual type    MDD (major depressive disorder), recurrent episode, moderate (HCC) - Primary    Panic disorder without agoraphobia               Reason for visit is No chief complaint on file  Encounter provider OLIVIER Ross    Provider located at [address] [place] [address]  [address] [facility] [address]  [facility] [address] [phone]      Recent Visits  No visits were found meeting these conditions  Showing recent visits within past 7 days and meeting all other requirements     Future Appointments  No visits were found meeting these conditions  Showing future appointments within next 150 days and meeting all other requirements        The patient was identified by name and date of birth  Jake Weston was informed that this is a telemedicine visit and that the visit is being conducted through [employer]  My office door was closed  No one else was in the room  She acknowledged consent and understanding of privacy and security of the video platform  The patient has agreed to participate and understands they can discontinue the visit at any time  Patient is aware this is a billable service  Pattie Damon is a 30 y o  female    HPI     No past medical history on file  No past surgical history on file      Current Outpatient Medications   Medication Sig Dispense Refill    acetaminophen (TYLENOL) 325 mg tablet Take 650 mg by mouth every 6 (six) hours as needed for mild pain      amphetamine-dextroamphetamine (ADDERALL) 10 mg tablet Take 1 tablet (10 mg total) by mouth daily With Breakfast Max Daily Amount: 10 mg 30 tablet 0    clotrimazole (LOTRIMIN) 1 % cream Apply topically 2 (two) times a day 30 g 0    ergocalciferol (VITAMIN D2) 50,000 units Take 1 capsule (50,000 Units total) by mouth once a week 4 capsule 3    escitalopram (LEXAPRO) 5 mg tablet Take 1 tablet (5 mg total) by mouth daily 30 tablet 1    fluconazole (DIFLUCAN) 100 mg tablet Take 1 tablet (100 mg total) by mouth once a week With Food/Lunch 4 tablet 5     No current facility-administered medications for this visit  No Known Allergies    Review of Systems    Video Exam    There were no vitals filed for this visit  Physical Exam     I spent 45 minutes directly with the patient during this visit    Data: López Shepard discussed that she believes she may be addicted to marijuana  We discussed addiction as a disease model  Discussed using self-care as a way to manage emotions  Discussed that all types self-care are different roads to the same destination  Discussed using what we can control-words, actions, and location-to help manage anxiety when we do not know what to do next  Psychoeducation about mind fullness based stress reduction    Assessment: López Shepard seems to be making significant progress  Concerned about marijuana use    Plan:  Next office visit discuss relationship issues in context of talking to the elderly because of the cultural barrier that she experiences    VIRTUAL VISIT 101 [address] acknowledges that she has consented to an online visit or consultation  She understands that the online visit is based solely on information provided by her, and that, in the absence of a face-to-face physical evaluation by the physician, the diagnosis she receives is both limited and provisional in terms of accuracy and completeness  This is not intended to replace a full medical face-to-face evaluation by the physician  López Shepard understands and accepts these terms

## 2020-08-19 ENCOUNTER — TELEMEDICINE (OUTPATIENT)
Dept: BEHAVIORAL/MENTAL HEALTH CLINIC | Facility: CLINIC | Age: 25
End: 2020-08-19
Payer: COMMERCIAL

## 2020-08-19 DIAGNOSIS — F41.0 PANIC DISORDER WITHOUT AGORAPHOBIA: ICD-10-CM

## 2020-08-19 DIAGNOSIS — F90.8 ADHD, ADULT RESIDUAL TYPE: ICD-10-CM

## 2020-08-19 DIAGNOSIS — F33.1 MDD (MAJOR DEPRESSIVE DISORDER), RECURRENT EPISODE, MODERATE (HCC): Primary | ICD-10-CM

## 2020-08-19 PROCEDURE — 90834 PSYTX W PT 45 MINUTES: CPT | Performed by: SOCIAL WORKER
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
## 2020-08-26 ENCOUNTER — TELEMEDICINE (OUTPATIENT)
Dept: BEHAVIORAL/MENTAL HEALTH CLINIC | Facility: CLINIC | Age: 25
End: 2020-08-26
Payer: COMMERCIAL

## 2020-08-26 DIAGNOSIS — F90.8 ADHD, ADULT RESIDUAL TYPE: ICD-10-CM

## 2020-08-26 DIAGNOSIS — F41.0 PANIC DISORDER WITHOUT AGORAPHOBIA: ICD-10-CM

## 2020-08-26 DIAGNOSIS — F33.1 MDD (MAJOR DEPRESSIVE DISORDER), RECURRENT EPISODE, MODERATE (HCC): Primary | ICD-10-CM

## 2020-08-26 PROCEDURE — 90834 PSYTX W PT 45 MINUTES: CPT | Performed by: SOCIAL WORKER

## 2020-08-26 NOTE — PSYCH
Virtual Regular Visit      Assessment/Plan:    Problem List Items Addressed This Visit        Other    ADHD, adult residual type    MDD (major depressive disorder), recurrent episode, moderate (HCC) - Primary    Panic disorder without agoraphobia               Reason for visit is No chief complaint on file  Encounter provider OLIVIER Nguyen    Provider located at [address] [place] [address] [address] [place] [address] [phone]      Recent Visits  Date Type Provider Dept   08/19/20 [address] [place] [address]  [facility] Therapist [facility]   Showing recent visits within past 7 days and meeting all other requirements     Future Appointments  No visits were found meeting these conditions  Showing future appointments within next 150 days and meeting all other requirements        The patient was identified by name and date of birth  Yandel Marquez was informed that this is a telemedicine visit and that the visit is being conducted through [employer]  My office door was closed  No one else was in the room  She acknowledged consent and understanding of privacy and security of the video platform  The patient has agreed to participate and understands they can discontinue the visit at any time  Patient is aware this is a billable service  [place] Vu is a 30 y o  female    HPI     No past medical history on file  No past surgical history on file      Current Outpatient Medications   Medication Sig Dispense Refill    acetaminophen (TYLENOL) 325 mg tablet Take 650 mg by mouth every 6 (six) hours as needed for mild pain      amphetamine-dextroamphetamine (ADDERALL) 10 mg tablet Take 1 tablet (10 mg total) by mouth daily With Breakfast Max Daily Amount: 10 mg 30 tablet 0    clotrimazole (LOTRIMIN) 1 % cream Apply topically 2 (two) times a day 30 g 0    ergocalciferol (VITAMIN D2) 50,000 units Take 1 capsule (50,000 Units total) by mouth once a week 4 capsule 3    escitalopram (LEXAPRO) 5 mg tablet Take 1 tablet (5 mg total) by mouth daily 30 tablet 1    fluconazole (DIFLUCAN) 100 mg tablet Take 1 tablet (100 mg total) by mouth once a week With Food/Lunch 4 tablet 5     No current facility-administered medications for this visit  No Known Allergies    Review of Systems    Video Exam    There were no vitals filed for this visit  Physical Exam     I spent 45 minutes directly with the patient during this visit    Data: Cary Dan discussed anger management in detail today  She states that she believes her emotions are what has helped her heal   However many have pointed out to her that her anger keep sign getting her trouble  However when discussing what health looks like for healthy actions during anger, Lisa Fernandez was not able to to find that for herself  Psychoeducation about using a back door approach to anger management and instead of defining what she would do while angry  She is to define what she will not do while angry and then what ever his left she will try that  She also stated concerns about moving into her parent's house  Attempted to talk to her about having an adult conversation with all the adults in the house  She stated that they have never done that ever  When asked why they could not now, she did not have any answer  Assessment: Cary Dan seems to be severely affected by the past trauma in her own life  She feels that compared to her friends still in [facility], being affected by the war going on there, that Lisa Fernandez feels she does not have any right to be depressed or be upset about her life    She struggles with excepting her emotions as real regardless of whether she finds value in the current circumstances of her life    Plan:  See above for assigned homework    VIRTUAL VISIT [address] [facility] [address] acknowledges that she has consented to an online visit or consultation  She understands that the online visit is based solely on information provided by her, and that, in the absence of a face-to-face physical evaluation by the physician, the diagnosis she receives is both limited and provisional in terms of accuracy and completeness  This is not intended to replace a full medical face-to-face evaluation by the physician  Riya Delgadillo understands and accepts these terms

## 2020-09-24 ENCOUNTER — TELEMEDICINE (OUTPATIENT)
Dept: BEHAVIORAL/MENTAL HEALTH CLINIC | Facility: CLINIC | Age: 25
End: 2020-09-24
Payer: COMMERCIAL

## 2020-09-24 ENCOUNTER — TELEPHONE (OUTPATIENT)
Dept: BEHAVIORAL/MENTAL HEALTH CLINIC | Facility: CLINIC | Age: 25
End: 2020-09-24

## 2020-09-24 DIAGNOSIS — F41.0 PANIC DISORDER WITHOUT AGORAPHOBIA: ICD-10-CM

## 2020-09-24 DIAGNOSIS — F33.1 MDD (MAJOR DEPRESSIVE DISORDER), RECURRENT EPISODE, MODERATE (HCC): Primary | ICD-10-CM

## 2020-09-24 DIAGNOSIS — F90.8 ADHD, ADULT RESIDUAL TYPE: ICD-10-CM

## 2020-09-24 PROCEDURE — 90834 PSYTX W PT 45 MINUTES: CPT | Performed by: SOCIAL WORKER

## 2020-09-24 NOTE — PSYCH
Virtual Regular Visit      Assessment/Plan:    Problem List Items Addressed This Visit        Other    ADHD, adult residual type    MDD (major depressive disorder), recurrent episode, moderate (HCC) - Primary    Panic disorder without agoraphobia               Reason for visit is No chief complaint on file  Encounter provider OLIVIER Phipps    Provider located at [address] [place] [address]  [address] [facility] [address]  [facility] [place] [facility] [address] [phone]      Recent Visits  No visits were found meeting these conditions  Showing recent visits within past 7 days and meeting all other requirements     Today's Visits  Date Type Provider Dept   09/24/20 Telephone Elias Grover, [address]  [facility]   Showing today's visits and meeting all other requirements     Future Appointments  No visits were found meeting these conditions  Showing future appointments within next 150 days and meeting all other requirements        The patient was identified by name and date of birth  Onesimo Coad was informed that this is a telemedicine visit and that the visit is being conducted through [employer]  My office door was closed  The patient was notified the following individuals were present in the room - lis shelby  She acknowledged consent and understanding of privacy and security of the video platform  The patient has agreed to participate and understands they can discontinue the visit at any time  Patient is aware this is a billable service  Alyssa Watt is a 30 y o  female    HPI     No past medical history on file  No past surgical history on file      Current Outpatient Medications   Medication Sig Dispense Refill    acetaminophen (TYLENOL) 325 mg tablet Take 650 mg by mouth every 6 (six) hours as needed for mild pain      amphetamine-dextroamphetamine (ADDERALL) 10 mg tablet Take 1 tablet (10 mg total) by mouth daily With Breakfast Max Daily Amount: 10 mg 30 tablet 0    clotrimazole (LOTRIMIN) 1 % cream Apply topically 2 (two) times a day 30 g 0    ergocalciferol (VITAMIN D2) 50,000 units Take 1 capsule (50,000 Units total) by mouth once a week 4 capsule 3    escitalopram (LEXAPRO) 5 mg tablet Take 1 tablet (5 mg total) by mouth daily 30 tablet 1    fluconazole (DIFLUCAN) 100 mg tablet Take 1 tablet (100 mg total) by mouth once a week With Food/Lunch 4 tablet 5     No current facility-administered medications for this visit  No Known Allergies    Review of Systems    Video Exam    There were no vitals filed for this visit  Physical Exam     I spent 45 minutes directly with the patient during this visit    Data: Louis Duenas stated that she was engaging in [address] online with another woman  Discussed the progress she has made in school so far  Discussed the struggles she is having 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 Assessment: Louis Duenas has made a lot of progress  Continues to struggle with anxiety and depression as well as anger, and some unresolved trauma  Other issues that are barriers for her R:  Being [place] refugee; being bisexual     Plan:  Discussed impending resignation of this therapist and her possible options  Originally, it seemed that the patient wanted to continue with this therapist in his private practice  However insurance may be an issue for that  She will make a decision within the coming week  VIRTUAL VISIT DISCLAIMER    Holli Barry acknowledges that she has consented to an online visit or consultation   She understands that the online visit is based solely on information provided by her, and that, in the absence of a face-to-face physical evaluation by the physician, the diagnosis she receives is both limited and provisional in terms of accuracy and completeness  This is not intended to replace a full medical face-to-face evaluation by the physician  Edi Dias understands and accepts these terms

## 2020-11-19 ENCOUNTER — OFFICE VISIT (OUTPATIENT)
Dept: FAMILY MEDICINE CLINIC | Facility: CLINIC | Age: 25
End: 2020-11-19
Payer: COMMERCIAL

## 2020-11-19 VITALS
HEART RATE: 93 BPM | RESPIRATION RATE: 14 BRPM | BODY MASS INDEX: 22.56 KG/M2 | OXYGEN SATURATION: 99 % | SYSTOLIC BLOOD PRESSURE: 102 MMHG | DIASTOLIC BLOOD PRESSURE: 72 MMHG | HEIGHT: 70 IN | TEMPERATURE: 98.9 F | WEIGHT: 157.6 LBS

## 2020-11-19 DIAGNOSIS — F17.210 CIGARETTE SMOKER: ICD-10-CM

## 2020-11-19 DIAGNOSIS — Z01.419 WOMEN'S ANNUAL ROUTINE GYNECOLOGICAL EXAMINATION: ICD-10-CM

## 2020-11-19 DIAGNOSIS — E55.9 VITAMIN D DEFICIENCY: ICD-10-CM

## 2020-11-19 DIAGNOSIS — F98.8 ATTENTION DEFICIT DISORDER, UNSPECIFIED HYPERACTIVITY PRESENCE: Primary | ICD-10-CM

## 2020-11-19 DIAGNOSIS — Z23 NEED FOR INFLUENZA VACCINATION: ICD-10-CM

## 2020-11-19 DIAGNOSIS — F41.9 ANXIETY: ICD-10-CM

## 2020-11-19 PROCEDURE — 4004F PT TOBACCO SCREEN RCVD TLK: CPT | Performed by: INTERNAL MEDICINE

## 2020-11-19 PROCEDURE — 99214 OFFICE O/P EST MOD 30 MIN: CPT | Performed by: INTERNAL MEDICINE

## 2020-11-19 PROCEDURE — 3008F BODY MASS INDEX DOCD: CPT | Performed by: INTERNAL MEDICINE

## 2020-11-19 RX ORDER — DEXTROAMPHETAMINE SACCHARATE, AMPHETAMINE ASPARTATE MONOHYDRATE, DEXTROAMPHETAMINE SULFATE AND AMPHETAMINE SULFATE 2.5; 2.5; 2.5; 2.5 MG/1; MG/1; MG/1; MG/1
10 CAPSULE, EXTENDED RELEASE ORAL EVERY MORNING
Qty: 30 CAPSULE | Refills: 0 | Status: SHIPPED | OUTPATIENT
Start: 2020-11-19 | End: 2021-01-13

## 2021-01-13 ENCOUNTER — TELEPHONE (OUTPATIENT)
Dept: PSYCHIATRY | Facility: CLINIC | Age: 26
End: 2021-01-13

## 2021-01-13 ENCOUNTER — TELEPHONE (OUTPATIENT)
Dept: FAMILY MEDICINE CLINIC | Facility: CLINIC | Age: 26
End: 2021-01-13

## 2021-01-13 DIAGNOSIS — F98.8 ATTENTION DEFICIT DISORDER, UNSPECIFIED HYPERACTIVITY PRESENCE: Primary | ICD-10-CM

## 2021-01-13 RX ORDER — DEXTROAMPHETAMINE SACCHARATE, AMPHETAMINE ASPARTATE, DEXTROAMPHETAMINE SULFATE AND AMPHETAMINE SULFATE 2.5; 2.5; 2.5; 2.5 MG/1; MG/1; MG/1; MG/1
10 TABLET ORAL DAILY
Qty: 30 TABLET | Refills: 0 | Status: SHIPPED | OUTPATIENT
Start: 2021-01-13

## 2021-01-13 NOTE — TELEPHONE ENCOUNTER
Holli called to schedule with a Therapist  She used to see Ruchi Antunez and was one of his urgent patients

## 2021-01-13 NOTE — TELEPHONE ENCOUNTER
Patient has apt on Jan 25th, however she is out of the Adderall XR, and her insurance denied it  She wants to know if you can call in the regular adderall for her? Please advise

## 2021-01-14 DIAGNOSIS — E55.9 VITAMIN D DEFICIENCY: ICD-10-CM

## 2021-01-14 RX ORDER — ERGOCALCIFEROL 1.25 MG/1
CAPSULE ORAL
Qty: 4 CAPSULE | Refills: 3 | Status: SHIPPED | OUTPATIENT
Start: 2021-01-14

## 2021-04-15 ENCOUNTER — TELEPHONE (OUTPATIENT)
Dept: FAMILY MEDICINE CLINIC | Facility: CLINIC | Age: 26
End: 2021-04-15

## 2021-04-15 NOTE — TELEPHONE ENCOUNTER
PC from patient, she missed her second dose of her COVID vaccine and wanted to know if she needs to restart the series or just get the second dose  Per Dr Fernando Gonzales, just get the second dose do not restart the series  Patient is aware

## 2021-06-03 ENCOUNTER — TELEPHONE (OUTPATIENT)
Dept: FAMILY MEDICINE CLINIC | Facility: CLINIC | Age: 26
End: 2021-06-03

## 2021-06-03 DIAGNOSIS — M25.561 ACUTE PAIN OF RIGHT KNEE: Primary | ICD-10-CM

## 2021-06-03 RX ORDER — MELOXICAM 7.5 MG/1
7.5 TABLET ORAL DAILY
Qty: 30 TABLET | Refills: 0 | Status: SHIPPED | OUTPATIENT
Start: 2021-06-03 | End: 2021-06-17

## 2021-06-03 NOTE — TELEPHONE ENCOUNTER
Patient came to window c/o right knee pain  Per Dr Arminda Rudolph, he will put xray in and send medication to pharmacy  After the results, if she is still in pain, call the office to make an apt

## 2021-06-04 ENCOUNTER — APPOINTMENT (OUTPATIENT)
Dept: RADIOLOGY | Age: 26
End: 2021-06-04
Payer: COMMERCIAL

## 2021-06-04 DIAGNOSIS — M25.561 ACUTE PAIN OF RIGHT KNEE: ICD-10-CM

## 2021-06-04 PROCEDURE — 73564 X-RAY EXAM KNEE 4 OR MORE: CPT

## 2021-06-17 ENCOUNTER — OFFICE VISIT (OUTPATIENT)
Dept: FAMILY MEDICINE CLINIC | Facility: CLINIC | Age: 26
End: 2021-06-17
Payer: COMMERCIAL

## 2021-06-17 VITALS
HEART RATE: 79 BPM | BODY MASS INDEX: 22.9 KG/M2 | DIASTOLIC BLOOD PRESSURE: 62 MMHG | OXYGEN SATURATION: 98 % | TEMPERATURE: 98.2 F | RESPIRATION RATE: 14 BRPM | WEIGHT: 160 LBS | HEIGHT: 70 IN | SYSTOLIC BLOOD PRESSURE: 110 MMHG

## 2021-06-17 DIAGNOSIS — Z12.4 SCREENING FOR CERVICAL CANCER: ICD-10-CM

## 2021-06-17 DIAGNOSIS — M25.461 PAIN AND SWELLING OF RIGHT KNEE: ICD-10-CM

## 2021-06-17 DIAGNOSIS — M25.561 PAIN AND SWELLING OF RIGHT KNEE: ICD-10-CM

## 2021-06-17 DIAGNOSIS — Z00.01 ENCOUNTER FOR GENERAL ADULT MEDICAL EXAMINATION WITH ABNORMAL FINDINGS: Primary | ICD-10-CM

## 2021-06-17 DIAGNOSIS — Z11.59 ENCOUNTER FOR HEPATITIS C SCREENING TEST FOR LOW RISK PATIENT: ICD-10-CM

## 2021-06-17 DIAGNOSIS — Z11.4 SCREENING FOR HIV (HUMAN IMMUNODEFICIENCY VIRUS): ICD-10-CM

## 2021-06-17 PROCEDURE — 3725F SCREEN DEPRESSION PERFORMED: CPT | Performed by: INTERNAL MEDICINE

## 2021-06-17 PROCEDURE — 99214 OFFICE O/P EST MOD 30 MIN: CPT | Performed by: INTERNAL MEDICINE

## 2021-06-17 PROCEDURE — 3008F BODY MASS INDEX DOCD: CPT | Performed by: INTERNAL MEDICINE

## 2021-06-17 PROCEDURE — 4004F PT TOBACCO SCREEN RCVD TLK: CPT | Performed by: INTERNAL MEDICINE

## 2021-06-17 PROCEDURE — 99395 PREV VISIT EST AGE 18-39: CPT | Performed by: INTERNAL MEDICINE

## 2021-06-17 RX ORDER — MELOXICAM 15 MG/1
15 TABLET ORAL DAILY
Qty: 30 TABLET | Refills: 2 | Status: SHIPPED | OUTPATIENT
Start: 2021-06-17

## 2021-06-17 RX ORDER — DOXYCYCLINE HYCLATE 100 MG/1
100 CAPSULE ORAL EVERY 12 HOURS SCHEDULED
Qty: 28 CAPSULE | Refills: 0 | Status: SHIPPED | OUTPATIENT
Start: 2021-06-17 | End: 2021-07-01

## 2021-06-17 NOTE — PROGRESS NOTES
Assessment/Plan:         Diagnoses and all orders for this visit:    Encounter for general adult medical examination with abnormal findings; Done in Detail    Encounter for hepatitis C screening test for low risk patient  -     Hepatitis C antibody; Future    Screening for HIV (human immunodeficiency virus)  -     HIV 1/2 Antigen/Antibody (4th Generation) w Reflex [facility]; Future    Screening for cervical cancer    Pain and swelling of right knee; Cause ?? RTC in 1-2 weeks w :  -     Sedimentation rate, automated; Future  -     C-reactive protein; Future  -     Lyme Antibody Profile with reflex to WB; Future  -     CBC and differential; Future  -     UA (URINE) with reflex to Scope; Future  -     Chlamydia/GC amplified DNA by PCR; Future  -     RPR; Future  Start :  -     doxycycline hyclate (VIBRAMYCIN) 100 mg capsule; Take 1 capsule (100 mg total) by mouth every 12 (twelve) hours for 14 days With food/Meals  -     meloxicam (MOBIC) 15 mg tablet; Take 1 tablet (15 mg total) by mouth daily With food/Breakfast    Other orders  -     Cancel: Ambulatory referral to Gynecology; Future        Subjective:      Patient ID: Amena Ocampo is a 30 y o  female  [address] is here for Annual Physical Exam and Regular check up, she started with right knee swelling , erythema, warm, for 1 week ago, No recent Blood  Work,  The following portions of the patient's history were reviewed and updated as appropriate: allergies, current medications, past family history, past social history, past surgical history and problem list     Review of Systems   Constitutional: Negative for chills, fatigue and fever  HENT: Negative for congestion, facial swelling, sore throat, trouble swallowing and voice change  Eyes: Negative for pain, discharge and visual disturbance  Respiratory: Negative for cough, shortness of breath and wheezing  Cardiovascular: Negative for chest pain, palpitations and leg swelling  Gastrointestinal: Negative for abdominal pain, blood in stool, constipation, diarrhea and nausea  Endocrine: Negative for polydipsia, polyphagia and polyuria  Genitourinary: Negative for difficulty urinating, hematuria and urgency  Musculoskeletal: Positive for arthralgias and joint swelling  Negative for myalgias  Skin: Negative for rash  Neurological: Negative for dizziness, tremors, weakness and headaches  Hematological: Negative for adenopathy  Does not bruise/bleed easily  Psychiatric/Behavioral: Negative for dysphoric mood, sleep disturbance and suicidal ideas  Objective:      /62 (BP Location: Left arm, Patient Position: Sitting, Cuff Size: Standard)   Pulse 79   Temp 98 2 °F (36 8 °C) (Tympanic)   Resp 14   Ht 5' 10" (1 778 m)   Wt 72 6 kg (160 lb)   SpO2 98%   BMI 22 96 kg/m²          Physical Exam  Constitutional:       General: She is not in acute distress  HENT:      Head: Normocephalic  Mouth/Throat:      Pharynx: No oropharyngeal exudate  Eyes:      General: No scleral icterus  Conjunctiva/sclera: Conjunctivae normal       Pupils: Pupils are equal, round, and reactive to light  Neck:      Thyroid: No thyromegaly  Cardiovascular:      Rate and Rhythm: Normal rate and regular rhythm  Heart sounds: Normal heart sounds  No murmur heard  Pulmonary:      Effort: Pulmonary effort is normal  No respiratory distress  Breath sounds: Normal breath sounds  No wheezing or rales  Abdominal:      General: Bowel sounds are normal  There is no distension  Palpations: Abdomen is soft  Tenderness: There is no abdominal tenderness  There is no guarding or rebound  Musculoskeletal:         General: Swelling and tenderness present  Cervical back: Neck supple  Lymphadenopathy:      Cervical: No cervical adenopathy  Skin:     Coloration: Skin is not pale  Findings: No rash     Neurological:      Mental Status: She is alert and oriented to person, place, and time  Sensory: No sensory deficit  Motor: No weakness

## 2021-06-18 ENCOUNTER — APPOINTMENT (OUTPATIENT)
Dept: LAB | Age: 26
End: 2021-06-18
Payer: COMMERCIAL

## 2021-06-18 DIAGNOSIS — M25.561 PAIN AND SWELLING OF RIGHT KNEE: ICD-10-CM

## 2021-06-18 DIAGNOSIS — Z11.4 SCREENING FOR HIV (HUMAN IMMUNODEFICIENCY VIRUS): ICD-10-CM

## 2021-06-18 DIAGNOSIS — Z11.59 ENCOUNTER FOR HEPATITIS C SCREENING TEST FOR LOW RISK PATIENT: ICD-10-CM

## 2021-06-18 DIAGNOSIS — M25.461 PAIN AND SWELLING OF RIGHT KNEE: ICD-10-CM

## 2021-06-18 LAB
BASOPHILS # BLD AUTO: 0.03 THOUSANDS/ΜL (ref 0–0.1)
BASOPHILS NFR BLD AUTO: 0 % (ref 0–1)
BILIRUB UR QL STRIP: NEGATIVE
CLARITY UR: CLEAR
COLOR UR: YELLOW
CRP SERPL QL: <3 MG/L
EOSINOPHIL # BLD AUTO: 0.2 THOUSAND/ΜL (ref 0–0.61)
EOSINOPHIL NFR BLD AUTO: 3 % (ref 0–6)
ERYTHROCYTE [DISTWIDTH] IN BLOOD BY AUTOMATED COUNT: 12.8 % (ref 11.6–15.1)
ERYTHROCYTE [SEDIMENTATION RATE] IN BLOOD: 3 MM/HOUR (ref 0–19)
GLUCOSE UR STRIP-MCNC: NEGATIVE MG/DL
HCT VFR BLD AUTO: 43.1 % (ref 34.8–46.1)
HCV AB SER QL: NORMAL
HGB BLD-MCNC: 14.2 G/DL (ref 11.5–15.4)
HGB UR QL STRIP.AUTO: NEGATIVE
IMM GRANULOCYTES # BLD AUTO: 0.02 THOUSAND/UL (ref 0–0.2)
IMM GRANULOCYTES NFR BLD AUTO: 0 % (ref 0–2)
KETONES UR STRIP-MCNC: NEGATIVE MG/DL
LEUKOCYTE ESTERASE UR QL STRIP: NEGATIVE
LYMPHOCYTES # BLD AUTO: 2.31 THOUSANDS/ΜL (ref 0.6–4.47)
LYMPHOCYTES NFR BLD AUTO: 32 % (ref 14–44)
MCH RBC QN AUTO: 30.4 PG (ref 26.8–34.3)
MCHC RBC AUTO-ENTMCNC: 32.9 G/DL (ref 31.4–37.4)
MCV RBC AUTO: 92 FL (ref 82–98)
MONOCYTES # BLD AUTO: 0.64 THOUSAND/ΜL (ref 0.17–1.22)
MONOCYTES NFR BLD AUTO: 9 % (ref 4–12)
NEUTROPHILS # BLD AUTO: 4.04 THOUSANDS/ΜL (ref 1.85–7.62)
NEUTS SEG NFR BLD AUTO: 56 % (ref 43–75)
NITRITE UR QL STRIP: NEGATIVE
NRBC BLD AUTO-RTO: 0 /100 WBCS
PH UR STRIP.AUTO: 6.5 [PH]
PLATELET # BLD AUTO: 211 THOUSANDS/UL (ref 149–390)
PMV BLD AUTO: 11.2 FL (ref 8.9–12.7)
PROT UR STRIP-MCNC: NEGATIVE MG/DL
RBC # BLD AUTO: 4.67 MILLION/UL (ref 3.81–5.12)
SP GR UR STRIP.AUTO: 1.02 (ref 1–1.03)
UROBILINOGEN UR QL STRIP.AUTO: 0.2 E.U./DL
WBC # BLD AUTO: 7.24 THOUSAND/UL (ref 4.31–10.16)

## 2021-06-18 PROCEDURE — 86592 SYPHILIS TEST NON-TREP QUAL: CPT

## 2021-06-18 PROCEDURE — 86803 HEPATITIS C AB TEST: CPT

## 2021-06-18 PROCEDURE — 80307 DRUG TEST PRSMV CHEM ANLYZR: CPT | Performed by: INTERNAL MEDICINE

## 2021-06-18 PROCEDURE — 86140 C-REACTIVE PROTEIN: CPT

## 2021-06-18 PROCEDURE — 81003 URINALYSIS AUTO W/O SCOPE: CPT | Performed by: INTERNAL MEDICINE

## 2021-06-18 PROCEDURE — 87591 N.GONORRHOEAE DNA AMP PROB: CPT

## 2021-06-18 PROCEDURE — 86618 LYME DISEASE ANTIBODY: CPT

## 2021-06-18 PROCEDURE — 85025 COMPLETE CBC W/AUTO DIFF WBC: CPT

## 2021-06-18 PROCEDURE — 36415 COLL VENOUS BLD VENIPUNCTURE: CPT

## 2021-06-18 PROCEDURE — 87389 HIV-1 AG W/HIV-1&-2 AB AG IA: CPT

## 2021-06-18 PROCEDURE — 85652 RBC SED RATE AUTOMATED: CPT

## 2021-06-18 PROCEDURE — 87491 CHLMYD TRACH DNA AMP PROBE: CPT

## 2021-06-19 LAB
AMPHETAMINES UR QL SCN: NEGATIVE NG/ML
B BURGDOR IGG+IGM SER-ACNC: -15
BARBITURATES UR QL SCN: NEGATIVE NG/ML
BENZODIAZ UR QL: NEGATIVE NG/ML
BZE UR QL: NEGATIVE NG/ML
C TRACH DNA SPEC QL NAA+PROBE: NEGATIVE
CANNABINOIDS UR QL SCN: NEGATIVE NG/ML
METHADONE UR QL SCN: NEGATIVE NG/ML
N GONORRHOEA DNA SPEC QL NAA+PROBE: NEGATIVE
OPIATES UR QL: NEGATIVE NG/ML
PCP UR QL: NEGATIVE NG/ML
PROPOXYPH UR QL SCN: NEGATIVE NG/ML

## 2021-06-21 LAB
HIV 1+2 AB+HIV1 P24 AG SERPL QL IA: NORMAL
RPR SER QL: NORMAL

## 2021-06-28 ENCOUNTER — OFFICE VISIT (OUTPATIENT)
Dept: FAMILY MEDICINE CLINIC | Facility: CLINIC | Age: 26
End: 2021-06-28
Payer: COMMERCIAL

## 2021-06-28 VITALS
HEART RATE: 98 BPM | OXYGEN SATURATION: 98 % | BODY MASS INDEX: 22.62 KG/M2 | DIASTOLIC BLOOD PRESSURE: 66 MMHG | RESPIRATION RATE: 14 BRPM | TEMPERATURE: 98.2 F | SYSTOLIC BLOOD PRESSURE: 112 MMHG | WEIGHT: 158 LBS | HEIGHT: 70 IN

## 2021-06-28 DIAGNOSIS — Z12.4 SCREENING FOR CERVICAL CANCER: ICD-10-CM

## 2021-06-28 DIAGNOSIS — M25.561 PAIN AND SWELLING OF RIGHT KNEE: ICD-10-CM

## 2021-06-28 DIAGNOSIS — M54.2 NECK PAIN: ICD-10-CM

## 2021-06-28 DIAGNOSIS — M25.361 UNSTABLE KNEE, RIGHT: Primary | ICD-10-CM

## 2021-06-28 DIAGNOSIS — M25.461 PAIN AND SWELLING OF RIGHT KNEE: ICD-10-CM

## 2021-06-28 DIAGNOSIS — R53.83 FATIGUE, UNSPECIFIED TYPE: ICD-10-CM

## 2021-06-28 PROCEDURE — 3008F BODY MASS INDEX DOCD: CPT | Performed by: INTERNAL MEDICINE

## 2021-06-28 PROCEDURE — 4004F PT TOBACCO SCREEN RCVD TLK: CPT | Performed by: INTERNAL MEDICINE

## 2021-06-28 PROCEDURE — 99214 OFFICE O/P EST MOD 30 MIN: CPT | Performed by: INTERNAL MEDICINE

## 2021-06-28 NOTE — PROGRESS NOTES
Assessment/Plan:         Diagnoses and all orders for this visit:    Unstable knee, right;  Improving Nicely, finish Up Meds,  RTC in 2-3 mos w :   -     MRI knee right  wo contrast; Future    Screening for cervical cancer    Pain and swelling of right knee; as above,  Neck pain  -     Ambulatory referral to Physical Therapy; Future    Fatigue, unspecified type; cause ?? RTC in 2 mos w :  -     Comprehensive metabolic panel; Future  -     CBC and differential; Future  -     Ferritin; Future  -     Magnesium; Future  -     Lipid panel; Future  -     TSH, 3rd generation; Future  -     T4, free; Future  -     UA (URINE) with reflex to Scope; Future  -     Vitamin B12; Future  -     Vitamin D 25 hydroxy; Future  -     Lyme Antibody Profile with reflex to WB; Future    Other orders  -     Cancel: Ambulatory referral to Gynecology; Future        Subjective:      Patient ID: Linda Barrett is a 30 y o  female  [address] is here for Regular check up, she has few symptoms , right knee pain and swelling is improving, recent Blood  Work and X rays, and Med List  reviewed,       The following portions of the patient's history were reviewed and updated as appropriate: allergies, current medications, past family history, past medical history, past social history, past surgical history and problem list     Review of Systems   Constitutional: Negative for chills, fatigue and fever  HENT: Negative for congestion, facial swelling, sore throat, trouble swallowing and voice change  Eyes: Negative for pain, discharge and visual disturbance  Respiratory: Negative for cough, shortness of breath and wheezing  Cardiovascular: Negative for chest pain, palpitations and leg swelling  Gastrointestinal: Negative for abdominal pain, blood in stool, constipation, diarrhea and nausea  Endocrine: Negative for polydipsia, polyphagia and polyuria  Genitourinary: Negative for difficulty urinating, hematuria and urgency  Problem: Mobility Impaired (Adult and Pediatric)  Goal: *Acute Goals and Plan of Care (Insert Text)  Description: FUNCTIONAL STATUS PRIOR TO ADMISSION: Patient was supervision level using a rolling walker or single point cane for functional mobility inside the home. Sometimes uses wheelchair for community level ambulation. HOME SUPPORT PRIOR TO ADMISSION: The patient lived with her daughters and required some assistance with bathing, iADLs and supervision during mobility. Physical Therapy Goals  Initiated 12/1/2020  1. Patient will move from supine to sit and sit to supine , scoot up and down, and roll side to side in bed with supervision/set-up within 7 day(s). 2.  Patient will transfer from bed to chair and chair to bed with supervision/set-up using the least restrictive device within 7 day(s). 3.  Patient will perform sit to stand with supervision/set-up within 7 day(s). 4.  Patient will ambulate with supervision/set-up for 80 feet with the least restrictive device within 7 day(s). 5.  Patient will ascend/descend 6 stairs with 1 handrail(s) with minimal assistance/contact guard assist within 7 day(s). PHYSICAL THERAPY EVALUATION  Patient: Ponce Mack (57 y.o. female)  Date: 12/1/2020  Primary Diagnosis: Atrial fibrillation with RVR ([facility] [address] [I48.91]        Precautions:          ASSESSMENT  Based on the objective data described below, the patient presents with generalized weakness, unsteady gait and dysfunction, decreased dynamic standing balance, chronic shoulder pain and stiffness and limited activity tolerance s/p admission for A-fib and DVT. Patient's daughter present and provided translation assistance as patient speaks minimal [place]. She currently requires MIN A to come to EOB due to weakness and B shoulder pain from bursitis. CG to MIN to stand with cues needed for proper hand placement.   Gait with RW was generally steady but safety concerns noted and frequent cues required to Musculoskeletal: Positive for back pain, joint swelling and neck pain  Negative for arthralgias and myalgias  Skin: Negative for rash  Neurological: Negative for dizziness, tremors, weakness and headaches  Hematological: Negative for adenopathy  Does not bruise/bleed easily  Psychiatric/Behavioral: Negative for dysphoric mood, sleep disturbance and suicidal ideas  Objective:      /66 (BP Location: Left arm, Patient Position: Sitting, Cuff Size: Standard)   Pulse 98   Temp 98 2 °F (36 8 °C) (Tympanic)   Resp 14   Ht 5' 10" (1 778 m)   Wt 71 7 kg (158 lb)   SpO2 98%   BMI 22 67 kg/m²          Physical Exam  Constitutional:       General: She is not in acute distress  HENT:      Head: Normocephalic  Mouth/Throat:      Pharynx: No oropharyngeal exudate  Eyes:      General: No scleral icterus  Conjunctiva/sclera: Conjunctivae normal       Pupils: Pupils are equal, round, and reactive to light  Neck:      Thyroid: No thyromegaly  Cardiovascular:      Rate and Rhythm: Normal rate and regular rhythm  Heart sounds: Normal heart sounds  No murmur heard  Pulmonary:      Effort: Pulmonary effort is normal  No respiratory distress  Breath sounds: Normal breath sounds  No wheezing or rales  Abdominal:      General: Bowel sounds are normal  There is no distension  Palpations: Abdomen is soft  Tenderness: There is no abdominal tenderness  There is no guarding or rebound  Musculoskeletal:         General: Swelling and tenderness present  Cervical back: Neck supple  Lymphadenopathy:      Cervical: No cervical adenopathy  Skin:     Coloration: Skin is not pale  Findings: No rash  Neurological:      Mental Status: She is alert and oriented to person, place, and time  Sensory: No sensory deficit  Motor: No weakness  maintain trunk extension and body within walker frame. Patient fatigued after gait to/from bathroom but VSS. At baseline, daughter reports she is ambulatory with RW or SPC or \"furniture walks\" in home, but stays active and assists with sweeping and mopping. Patient will be safe for d/c home only with family 24/7 assistance with all mobility and self-care as she currently exhibits high fall risk characteristics. Daughter relates her and other family will be able to provide this assistance. Recommend HHPT for home safety eval and to maximize return of function. Current Level of Function Impacting Discharge (mobility/balance): MIN A for bed mobility, transfers and gait with RW x 25'    Functional Outcome Measure: The patient scored Total: 35/100 on the Barthel Index which is indicative of 65% impaired ability to care for basic self needs/dependency on others. Other factors to consider for discharge: supportive family     Patient will benefit from skilled therapy intervention to address the above noted impairments. PLAN :  Recommendations and Planned Interventions: bed mobility training, transfer training, gait training, therapeutic exercises, neuromuscular re-education, patient and family training/education, and therapeutic activities      Frequency/Duration: Patient will be followed by physical therapy:  5 times a week to address goals. Recommendation for discharge: (in order for the patient to meet his/her long term goals)  Physical therapy at least 2 days/week in the home AND ensure assist and/or supervision for safety with all mobility and self-care    This discharge recommendation:  Has been made in collaboration with the attending provider and/or case management    IF patient discharges home will need the following DME: patient owns DME required for discharge         SUBJECTIVE:   Patient stated Reports being tired but very agreeable throughout session.     OBJECTIVE DATA SUMMARY:   HISTORY: Past Medical History:   Diagnosis Date    BPPV (benign paroxysmal positional vertigo)     DM type 2 causing vascular disease (HCC)     HTN (hypertension)     Hx of completed stroke     Hyperlipidemia     Hypothyroid     Rheumatoid arthritis ([facility] [address]     Scoliosis      Past Surgical History:   Procedure Laterality Date    HX  SECTION      HX ORTHOPAEDIC      bunions removed x 2       Personal factors and/or comorbidities impacting plan of care: B shoulder bursitis    Home Situation  Home Environment: Private residence  # Steps to Enter: 6  Rails to Enter: Yes  Hand Rails : Bilateral(far apart)  One/Two Story Residence: Two story, live on 1st floor  Living Alone: No  Support Systems: Child(shyla)  Patient Expects to be Discharged to[de-identified] Private residence  Current DME Used/Available at Home: Eduarda Hilary, straight, Walker, rolling, Wheelchair  Tub or Shower Type: Tub/Shower combination    EXAMINATION/PRESENTATION/DECISION MAKING:   Critical Behavior:              Hearing: Auditory  Auditory Impairment: None    Range Of Motion:  AROM: Generally decreased, functional(limited B shoulder mobility)           PROM: Generally decreased, functional           Strength:    Strength: Generally decreased, functional                    Tone & Sensation:   Tone: Normal                              Coordination:  Coordination: Generally decreased, functional  Vision:      Functional Mobility:  Bed Mobility:  Rolling: Contact guard assistance  Supine to Sit: Minimum assistance     Scooting: Minimum assistance  Transfers:  Sit to Stand: Contact guard assistance;Minimum assistance  Stand to Sit: Contact guard assistance;Minimum assistance                       Balance:   Sitting: Intact  Standing: Impaired  Standing - Static: Constant support;Good  Standing - Dynamic : Constant support; Fair  Ambulation/Gait Training:  Distance (ft): 25 Feet (ft)  Assistive Device: Gait belt;Walker, rolling  Ambulation - Level of Assistance: Minimal assistance;Contact guard assistance(occ walker management assist)        Gait Abnormalities: Decreased step clearance;Shuffling gait(flexed trunk)        Base of Support: Narrowed     Speed/Amber: Slow;Shuffled  Step Length: Right shortened;Left shortened               Functional Measure:  Barthel Index:    Bathin  Bladder: 5  Bowels: 5  Groomin  Dressin  Feedin  Mobility: 0  Stairs: 0  Toilet Use: 5  Transfer (Bed to Chair and Back): 10  Total: 35/100       The Barthel ADL Index: Guidelines  1. The index should be used as a record of what a patient does, not as a record of what a patient could do. 2. The main aim is to establish degree of independence from any help, physical or verbal, however minor and for whatever reason. 3. The need for supervision renders the patient not independent. 4. A patient's performance should be established using the best available evidence. Asking the patient, friends/relatives and nurses are the usual sources, but direct observation and common sense are also important. However direct testing is not needed. 5. Usually the patient's performance over the preceding 24-48 hours is important, but occasionally longer periods will be relevant. 6. Middle categories imply that the patient supplies over 50 per cent of the effort. 7. Use of aids to be independent is allowed. Ple[place]., Barthel, D.W. (6030). Functional evaluation: the Barthel Index. [address] (14)2. CODIE Hobson, Nadiya Royal., Nava Vasquez., [place], [address] [place] [address] (). Measuring the change indisability after inpatient rehabilitation; comparison of the responsiveness of the Barthel Index and Functional Reform Measure. Journal of Neurology, Neurosurgery, and Psychiatry, 66(4), 474-140. Ilana Friend [email or web address], THERESA Celeste, & Manohar Malcolm MVioletA. (2004.) Assessment of post-stroke quality of life in cost-effectiveness studies: The usefulness of the Barthel Index and the EuroQoL-5D. Quality of Life Research, 15, 998-81        Physical Therapy Evaluation Charge Determination   History Examination Presentation Decision-Making   HIGH Complexity :3+ comorbidities / personal factors will impact the outcome/ POC  MEDIUM Complexity : 3 Standardized tests and measures addressing body structure, function, activity limitation and / or participation in recreation  MEDIUM Complexity : Evolving with changing characteristics  Other outcome measures Barthel 35  HIGH       Based on the above components, the patient evaluation is determined to be of the following complexity level: MEDIUM    Pain Rating:  None reported     Activity Tolerance:   Fair and requires rest breaks    After treatment patient left in no apparent distress:   Sitting in chair, Call bell within reach, and Caregiver / family present    COMMUNICATION/EDUCATION:   The patients plan of care was discussed with: Occupational therapist and Registered nurse. Fall prevention education was provided and the patient/caregiver indicated understanding. and Patient/family agree to work toward stated goals and plan of care.     Thank you for this referral.  Sukhjinder Form, PT   Time Calculation: 23 mins

## 2021-07-12 ENCOUNTER — OFFICE VISIT (OUTPATIENT)
Dept: URGENT CARE | Age: 26
End: 2021-07-12
Payer: COMMERCIAL

## 2021-07-12 VITALS — TEMPERATURE: 97.7 F | OXYGEN SATURATION: 98 % | HEART RATE: 92 BPM | RESPIRATION RATE: 18 BRPM

## 2021-07-12 DIAGNOSIS — J02.9 PHARYNGITIS, UNSPECIFIED ETIOLOGY: Primary | ICD-10-CM

## 2021-07-12 LAB — S PYO AG THROAT QL: NEGATIVE

## 2021-07-12 PROCEDURE — 87070 CULTURE OTHR SPECIMN AEROBIC: CPT | Performed by: NURSE PRACTITIONER

## 2021-07-12 PROCEDURE — 99213 OFFICE O/P EST LOW 20 MIN: CPT | Performed by: NURSE PRACTITIONER

## 2021-07-12 PROCEDURE — 87147 CULTURE TYPE IMMUNOLOGIC: CPT | Performed by: NURSE PRACTITIONER

## 2021-07-12 RX ORDER — AMOXICILLIN 875 MG/1
875 TABLET, COATED ORAL 2 TIMES DAILY
Qty: 14 TABLET | Refills: 0 | Status: SHIPPED | OUTPATIENT
Start: 2021-07-12 | End: 2021-07-19

## 2021-07-12 NOTE — PROGRESS NOTES
[facility]        NAME: Kari Ayala is a 30 y o  female  : 1995    MRN: [id number]  DATE: 2021  TIME: 8:41 AM    Assessment and Plan   Pharyngitis, unspecified etiology [J02 9]  1  Pharyngitis, unspecified etiology  amoxicillin (AMOXIL) 875 mg tablet    POCT rapid strepA    Throat culture         Patient Instructions     Rapid strep is negative; will send for culture  Cont OTC med for pain prn  antibiotics prescribed based on presentation   Follow up with PCP in 3-5 days  Proceed to  ER if symptoms worsen  Chief Complaint     Chief Complaint   Patient presents with    Fever     and bODYACHES , COVID VACCINE #2 4/15/2021    Sore Throat         History of Present Illness       HPI   Reports severe sore throat, and getting worse  duration 3 days  Fever at home  Highest temp was 102 degrees  Traveled recently to [place], 2 weeks ago  Already had the covid vaccine  No known sick contacts in the last one week  Review of Systems   Review of Systems   Constitutional: Positive for fever  Negative for chills  HENT: Positive for sore throat and trouble swallowing (d/t pain)  Respiratory: Negative for shortness of breath  Cardiovascular: Negative for chest pain  Gastrointestinal: Negative for nausea and vomiting  Neurological: Negative for dizziness and headaches  Current Medications       Current Outpatient Medications:     levonorgestrel (MIRENA) 20 MCG/24HR IUD, 1 each by Intrauterine route once, Disp: , Rfl:     acetaminophen (TYLENOL) 325 mg tablet, Take 650 mg by mouth every 6 (six) hours as needed for mild pain, Disp: , Rfl:     amoxicillin (AMOXIL) 875 mg tablet, Take 1 tablet (875 mg total) by mouth 2 (two) times a day for 7 days, Disp: 14 tablet, Rfl: 0    amphetamine-dextroamphetamine (ADDERALL) 10 mg tablet, Take 1 tablet (10 mg total) by mouth daily With Food/Breakfast  Mariana Katz Max Daily Amount: 10 mg (Patient not taking: Reported on 2021), Disp: 30 tablet, Rfl: 0    ergocalciferol (VITAMIN D2) 50,000 units, TAKE 1 CAPSULE BY MOUTH 1 TIME A WEEK (Patient not taking: Reported on 7/12/2021), Disp: 4 capsule, Rfl: 3    meloxicam (MOBIC) 15 mg tablet, Take 1 tablet (15 mg total) by mouth daily With food/Breakfast (Patient not taking: Reported on 7/12/2021), Disp: 30 tablet, Rfl: 2    Current Allergies     Allergies as of 07/12/2021    (No Known Allergies)            The following portions of the patient's history were reviewed and updated as appropriate: allergies, current medications, past family history, past medical history, past social history, past surgical history and problem list      History reviewed  No pertinent past medical history  History reviewed  No pertinent surgical history  Family History   Problem Relation Age of Onset    No Known Problems Mother     No Known Problems Father          Medications have been verified  Objective   Pulse 92   Temp 97 7 °F (36 5 °C)   Resp 18   LMP 06/01/2021   SpO2 98%   Patient's last menstrual period was 06/01/2021  Physical Exam     Physical Exam  Constitutional:       Appearance: She is ill-appearing  She is not diaphoretic  HENT:      Right Ear: Tympanic membrane and ear canal normal       Left Ear: Tympanic membrane and ear canal normal       Mouth/Throat: Tonsils: Tonsillar exudate (large amount, with severe tonsillar erythema) present  2+ on the right  2+ on the left  Cardiovascular:      Rate and Rhythm: Regular rhythm  Pulmonary:      Effort: Pulmonary effort is normal       Breath sounds: Normal breath sounds  Lymphadenopathy:      Cervical: Cervical adenopathy present

## 2021-07-12 NOTE — PATIENT INSTRUCTIONS

## 2021-07-14 LAB — BACTERIA THROAT CULT: ABNORMAL

## 2021-07-29 ENCOUNTER — TELEPHONE (OUTPATIENT)
Dept: FAMILY MEDICINE CLINIC | Facility: CLINIC | Age: 26
End: 2021-07-29

## 2021-07-29 DIAGNOSIS — M25.361 UNSTABLE KNEE, RIGHT: ICD-10-CM

## 2021-07-29 DIAGNOSIS — M25.561 PAIN AND SWELLING OF RIGHT KNEE: Primary | ICD-10-CM

## 2021-07-29 DIAGNOSIS — M25.461 PAIN AND SWELLING OF RIGHT KNEE: Primary | ICD-10-CM

## 2021-08-26 ENCOUNTER — OFFICE VISIT (OUTPATIENT)
Dept: OBGYN CLINIC | Facility: HOSPITAL | Age: 26
End: 2021-08-26
Payer: COMMERCIAL

## 2021-08-26 VITALS
HEIGHT: 70 IN | WEIGHT: 165 LBS | BODY MASS INDEX: 23.62 KG/M2 | DIASTOLIC BLOOD PRESSURE: 71 MMHG | SYSTOLIC BLOOD PRESSURE: 103 MMHG | HEART RATE: 69 BPM

## 2021-08-26 DIAGNOSIS — M22.2X1 PATELLOFEMORAL DISORDER OF RIGHT KNEE: Primary | ICD-10-CM

## 2021-08-26 DIAGNOSIS — M25.361 UNSTABLE KNEE, RIGHT: ICD-10-CM

## 2021-08-26 DIAGNOSIS — M25.461 PAIN AND SWELLING OF RIGHT KNEE: ICD-10-CM

## 2021-08-26 DIAGNOSIS — M25.561 PAIN AND SWELLING OF RIGHT KNEE: ICD-10-CM

## 2021-08-26 PROCEDURE — 3008F BODY MASS INDEX DOCD: CPT | Performed by: ORTHOPAEDIC SURGERY

## 2021-08-26 PROCEDURE — 4004F PT TOBACCO SCREEN RCVD TLK: CPT | Performed by: ORTHOPAEDIC SURGERY

## 2021-08-26 PROCEDURE — 99243 OFF/OP CNSLTJ NEW/EST LOW 30: CPT | Performed by: ORTHOPAEDIC SURGERY

## 2021-08-26 NOTE — PROGRESS NOTES
Orthopaedics Office Visit - New Patient Visit    ASSESSMENT/PLAN:    Assessment:   Right knee patellofemoral pain    Plan:   - Activities as tolerated  - PT was ordered today  - Will consider MRI if pain does not improve  - Discussed low impact aerobic activity and to avoid squatting at this time  - Ice and OTC antiinflammatories as needed for pain   - Follow up 6 weeks/PRN    To Do Next Visit:  Repeat exam     _____________________________________________________  CHIEF COMPLAINT:  Chief Complaint   Patient presents with    Right Knee - Pain         SUBJECTIVE:  Margo Suarez is a 30 y o  female who presents to the office today for consultation regarding her right knee  Patient notes an onset of right knee discomfort about three and half months ago  There is no injury or trauma  She does note a potential tick bite around the same time  She was tested for Lyme disease which was negative  She was treated with doxycycline which gave her a severe sore throat which she was then given amoxicillin for which resolved that  She now notes on and off sore throats and discomfort in the knee  She also states she has a new cold sore which according to her only presents thumb cells when she is immunocompromised  Her pain is localized around the patella and occasionally behind the knee  She states that the knee feels "tight "  She is having pain with steps and first couple steps after sitting for prolonged periods of time  She has not had any therapy, injections or bracing for this  PAST MEDICAL HISTORY:  History reviewed  No pertinent past medical history  PAST SURGICAL HISTORY:  History reviewed  No pertinent surgical history      FAMILY HISTORY:  Family History   Problem Relation Age of Onset    No Known Problems Mother     No Known Problems Father        SOCIAL HISTORY:  Social History     Tobacco Use    Smoking status: Light Tobacco Smoker     Packs/day: 0 25    Smokeless tobacco: Never Used   Vaping Use    Vaping Use: Never used   Substance Use Topics    Alcohol use: Yes     Alcohol/week: 1 0 standard drinks     Types: 1 Cans of beer per week    Drug use: Never       MEDICATIONS:    Current Outpatient Medications:     acetaminophen (TYLENOL) 325 mg tablet, Take 650 mg by mouth every 6 (six) hours as needed for mild pain, Disp: , Rfl:     levonorgestrel (MIRENA) 20 MCG/24HR IUD, 1 each by Intrauterine route once, Disp: , Rfl:     amphetamine-dextroamphetamine (ADDERALL) 10 mg tablet, Take 1 tablet (10 mg total) by mouth daily With Food/Breakfast  [facility] Max Daily Amount: 10 mg (Patient not taking: Reported on 7/12/2021), Disp: 30 tablet, Rfl: 0    ergocalciferol (VITAMIN D2) 50,000 units, TAKE 1 CAPSULE BY MOUTH 1 TIME A WEEK (Patient not taking: Reported on 7/12/2021), Disp: 4 capsule, Rfl: 3    meloxicam (MOBIC) 15 mg tablet, Take 1 tablet (15 mg total) by mouth daily With food/Breakfast (Patient not taking: Reported on 7/12/2021), Disp: 30 tablet, Rfl: 2    ALLERGIES:  No Known Allergies    REVIEW OF SYSTEMS:  MSK:   Right knee pain  Neuro: WNL  Pertinent items are otherwise noted in HPI  A comprehensive review of systems was otherwise negative  LABS:  HgA1c:   Lab Results   Component Value Date    HGBA1C 5 0 08/16/2019     BMP:   Lab Results   Component Value Date    CALCIUM 9 0 08/16/2019    K 4 2 08/16/2019    CO2 29 08/16/2019     (H) 08/16/2019    BUN 15 08/16/2019    CREATININE 0 75 08/16/2019     CBC: No components found for: CBC    _____________________________________________________  PHYSICAL EXAMINATION:  Vital signs: /71   Pulse 69   Ht 5' 9 75" (1 772 m)   Wt 74 8 kg (165 lb)   BMI 23 85 kg/m²   General: No acute distress, awake and alert  Psychiatric: Mood and affect appear appropriate  HEENT: Trachea Midline, No torticollis, no apparent facial trauma  Cardiovascular: No audible murmurs;  Extremities appear perfused  Pulmonary: No audible wheezing or stridor  Skin: No open lesions; see further details (if any) below    MUSCULOSKELETAL EXAMINATION:  Extremities:   Right knee    Skin intact, warm, dry and well perfused  No significant tenderness  Patella tracks centrally   Full knee range of motion 0-130 degrees  Knee stable to varus and valgus stress  Neurovascularly intact distally      _____________________________________________________  STUDIES REVIEWED:  I personally reviewed the images and interpretation is as follows:    4 view x-rays of the right knee obtained 06/04/2021 demonstrate no acute fracture, dislocation or degenerative changes      PROCEDURES PERFORMED:  None today    Tyrese Trevizo MA - Spenser Aguilar MD

## 2021-08-26 NOTE — LETTER
August 27, 2021     [address], [address] [facility] [address]    Patient: Davonte Bhagat   YOB: 1995   Date of Visit: 8/26/2021       Dear Dr Nuha Gonzalez: Thank you for referring Minda Hyman to me for evaluation  Below are my notes for this consultation  If you have questions, please do not hesitate to call me  I look forward to following your patient along with you  Sincerely,        Lady Jose MD        CC: No Recipients  Lady Jose MD  8/26/2021  8:56 PM  Signed  Orthopaedics Office Visit - New Patient Visit    ASSESSMENT/PLAN:    Assessment:   Right knee patellofemoral pain    Plan:   - Activities as tolerated  - PT was ordered today  - Will consider MRI if pain does not improve  - Discussed low impact aerobic activity and to avoid squatting at this time  - Ice and OTC antiinflammatories as needed for pain   - Follow up 6 weeks/PRN    To Do Next Visit:  Repeat exam     _____________________________________________________  CHIEF COMPLAINT:  Chief Complaint   Patient presents with    Right Knee - Pain         SUBJECTIVE:  Davonte Bhagat is a 30 y o  female who presents to the office today for consultation regarding her right knee  Patient notes an onset of right knee discomfort about three and half months ago  There is no injury or trauma  She does note a potential tick bite around the same time  She was tested for Lyme disease which was negative  She was treated with doxycycline which gave her a severe sore throat which she was then given amoxicillin for which resolved that  She now notes on and off sore throats and discomfort in the knee  She also states she has a new cold sore which according to her only presents thumb cells when she is immunocompromised  Her pain is localized around the patella and occasionally behind the knee  She states that the knee feels "tight "   She is having pain with steps and first couple steps after sitting for prolonged periods of time  She has not had any therapy, injections or bracing for this  PAST MEDICAL HISTORY:  History reviewed  No pertinent past medical history  PAST SURGICAL HISTORY:  History reviewed  No pertinent surgical history  FAMILY HISTORY:  Family History   Problem Relation Age of Onset    No Known Problems Mother     No Known Problems Father        SOCIAL HISTORY:  Social History     Tobacco Use    Smoking status: Light Tobacco Smoker     Packs/day: 0 25    Smokeless tobacco: Never Used   Vaping Use    Vaping Use: Never used   Substance Use Topics    Alcohol use: Yes     Alcohol/week: 1 0 standard drinks     Types: 1 Cans of beer per week    Drug use: Never       MEDICATIONS:    Current Outpatient Medications:     acetaminophen (TYLENOL) 325 mg tablet, Take 650 mg by mouth every 6 (six) hours as needed for mild pain, Disp: , Rfl:     levonorgestrel (MIRENA) 20 MCG/24HR IUD, 1 each by Intrauterine route once, Disp: , Rfl:     amphetamine-dextroamphetamine (ADDERALL) 10 mg tablet, Take 1 tablet (10 mg total) by mouth daily With Food/Breakfast  [facility] Max Daily Amount: 10 mg (Patient not taking: Reported on 7/12/2021), Disp: 30 tablet, Rfl: 0    ergocalciferol (VITAMIN D2) 50,000 units, TAKE 1 CAPSULE BY MOUTH 1 TIME A WEEK (Patient not taking: Reported on 7/12/2021), Disp: 4 capsule, Rfl: 3    meloxicam (MOBIC) 15 mg tablet, Take 1 tablet (15 mg total) by mouth daily With food/Breakfast (Patient not taking: Reported on 7/12/2021), Disp: 30 tablet, Rfl: 2    ALLERGIES:  No Known Allergies    REVIEW OF SYSTEMS:  MSK:   Right knee pain  Neuro: WNL  Pertinent items are otherwise noted in HPI  A comprehensive review of systems was otherwise negative      LABS:  HgA1c:   Lab Results   Component Value Date    HGBA1C 5 0 08/16/2019     BMP:   Lab Results   Component Value Date    CALCIUM 9 0 08/16/2019    K 4 2 08/16/2019    CO2 29 08/16/2019     (H) 08/16/2019    BUN 15 08/16/2019 CREATININE 0 75 08/16/2019     CBC: No components found for: CBC    _____________________________________________________  PHYSICAL EXAMINATION:  Vital signs: /71   Pulse 69   Ht 5' 9 75" (1 772 m)   Wt 74 8 kg (165 lb)   BMI 23 85 kg/m²   General: No acute distress, awake and alert  Psychiatric: Mood and affect appear appropriate  HEENT: Trachea Midline, No torticollis, no apparent facial trauma  Cardiovascular: No audible murmurs;  Extremities appear perfused  Pulmonary: No audible wheezing or stridor  Skin: No open lesions; see further details (if any) below    MUSCULOSKELETAL EXAMINATION:  Extremities:   Right knee    Skin intact, warm, dry and well perfused  No significant tenderness  Patella tracks centrally   Full knee range of motion 0-130 degrees  Knee stable to varus and valgus stress  Neurovascularly intact distally      _____________________________________________________  STUDIES REVIEWED:  I personally reviewed the images and interpretation is as follows:    4 view x-rays of the right knee obtained 06/04/2021 demonstrate no acute fracture, dislocation or degenerative changes      PROCEDURES PERFORMED:  None today    JACKY Wells MD

## 2021-08-30 ENCOUNTER — EVALUATION (OUTPATIENT)
Dept: PHYSICAL THERAPY | Age: 26
End: 2021-08-30
Payer: COMMERCIAL

## 2021-08-30 VITALS — SYSTOLIC BLOOD PRESSURE: 98 MMHG | HEART RATE: 74 BPM | DIASTOLIC BLOOD PRESSURE: 74 MMHG

## 2021-08-30 DIAGNOSIS — M54.2 NECK PAIN: ICD-10-CM

## 2021-08-30 DIAGNOSIS — M22.2X1 PATELLOFEMORAL DISORDER OF RIGHT KNEE: Primary | ICD-10-CM

## 2021-08-30 PROCEDURE — 97162 PT EVAL MOD COMPLEX 30 MIN: CPT

## 2021-08-30 NOTE — PROGRESS NOTES
PT Evaluation     Today's date: 2021  Patient name: Darrell Majano  : 1995  MRN: [id number]  Referring provider: Geronimo Graham MD  Dx:   Encounter Diagnosis     ICD-10-CM    1  Patellofemoral disorder of right knee  M22 2X1    2  Neck pain  M54 2 Ambulatory referral to Physical Therapy                  Assessment  Assessment details: Darrell Majano is a 30year old female presenting to PT with cervical pain for 1 and 1/2 years and exacerbation of right knee patellofemoral pain in 2021  PT should prove beneficial to address poor postural status, decreased cervical and hip external rotation rom and bilateral and cervical strength deficits in efforts to return to all prior activity without increased c/o pain  She is also interested in increasing her overall activity level without increased c/o pain  PT has suggested better footwear with increased arch support to assist with improved foot/ankle and patellofemoral   Review also perform for proper sitting at computer and with studying to flex from head and neck not neck protraction   Emphasis on home exer programming  Trial graston soft tissue mobilization technique in the future  Impairments: abnormal or restricted ROM, activity intolerance, impaired balance, impaired physical strength, lacks appropriate home exercise program, pain with function, poor posture  and poor body mechanics  Functional limitations: decreased tolerance to bending , lifting , reaching, computerwork , prolonged sitting   Symptom irritability: moderateUnderstanding of Dx/Px/POC: good   Prognosis: good    Goals  stg 1 : independence in home exer program in two weeks   2  Achieve full cervical rom in two weeks without increased c/o pain   ltg 1  Reduction of neck and knee pain by 50 percent in 4 weeks   2    Improve right knee patellofemoral pain with increase patellar tracking wtl's in 4 weeks   3 achieve full bilateral hip external rotation in 4 weeks   4 achieve 5/5 mmt cervical muscles scapular muscles and le's in 4-6 weeks         Plan  Patient would benefit from: PT eval and skilled physical therapy  Referral necessary: Yes  Other planned modality interventions: modalities as needed  Planned therapy interventions: patient education, neuromuscular re-education, massage, Garcia taping, manual therapy, body mechanics training, strengthening, stretching, therapeutic activities, therapeutic exercise and home exercise program  Frequency: 2x week  Duration in weeks: 8  Plan of Care beginning date: 2021  Plan of Care expiration date: 10/29/2021  Treatment plan discussed with: patient        Subjective Evaluation    History of Present Illness  Onset date: neck pain for 1 and 1/2 years, exacerbation of right knee pain 2021  Mechanism of injury: Mili Rodríguez is a 30year old female referred to outpt PT with a diagnosis cervical pain for 1 and 1/2 years with increased college courses being on line per her report and increased sitting with less activity  The pt is also diagnosed with right greater than left patellofemoral pain with exacerbation of symptoms noted in 2021 and [facility] complaining of leg weakness and knee pain since childhood  She does report as a child having a toe in gait and must remind herself to avoid toe in gait  She was also working part time as a PCA with much lifting , walking and bending activities  She also reports bruxism at night and wakes up with facial muscle soreness            Recurrent probem    Quality of life: good    Pain  Current pain ratin  At best pain ratin  At worst pain ratin  Location: neck pain , right knee pain,  no left knee pain currently   Quality: radiating, sharp, tight, pulling and dull ache  Relieving factors: heat, change in position and rest  Aggravating factors: stair climbing, walking, sitting, lifting and overhead activity  Progression: worsening    Social Support  Steps to enter house: yes (2 steps )  Stairs in house: yes (15 steps left railing )   Lives in: multiple-level home  Lives with: parents and young children    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[employer], part time and PCA part time )  Exercise history: used to play basketball, when [place] this [place] 8 [place] ago ,     Treatments  Current treatment: physical therapy  Patient Goals  Patient goals for therapy: decreased pain, increased strength, increased motion and return to sport/leisure activities  Patient goal: reduction in pain by 50 percent in 4 weeks         Objective     Active Range of Motion   Cervical/Thoracic Spine       Cervical  Subcranial protraction:  WFL and with pain   Subcranial retraction: Active cervical subcranial retraction: 3/4 range    Flexion: Neck active flexion: 2 cm chin to sternal notch   with pain  Extension: Neck active extension: 22 cm chin to sternal notch initially pain then less pain with stretch  Left lateral flexion: Neck active lateral bend left: 14cm earlobe to acromion  Right lateral flexion: Neck active lateral bend right: 14cm  Left rotation: Neck active rotation left: 14cm chin to acromion  Right rotation: Neck active rotation right: 13cm chin to acromion           Left Hip   Flexion: WFL  Extension: WFL  Abduction: WFL  Adduction: WFL  External rotation (90/90): 35 degrees   Internal rotation (90/90): 45 degrees     Right Hip   Flexion: WFL  Extension: WFL  Abduction: WFL  Adduction: WFL  External rotation (90/90): 35 degrees   Internal rotation (90/90): 45 degrees   Left Knee   Flexion: WFL  Extension: WFL    Right Knee   Flexion: 125 degrees with pain  Extension: WFL  Left Ankle/Foot   Dorsiflexion (ke): WFL  Plantar flexion: WFL    Right Ankle/Foot   Dorsiflexion (ke): WFL  Plantar flexion: [facility]    Additional Active Range of Motion Details  Trunk flex, ext , rotation and sidebending wfl's ,   Pt c/o left 1st hallus tingling Present with touch only  Posture left shoulder higher than right , excessive femoral internal rotation present, flattened t spine , significant forward head noted , bilateral upper trap trigger points noted, periscapular trigger points noted right greater than left   Median radial and ulnar nerve glides clear  Assess q ankle in future,    Excessive hip internal rotation position, squinting patellas noted,  Right knee patellar fat pad irritation present , + ITB tightness min present bilaterally  Toe in gait noted , pt keeping right knee flexion 15 degrees due to pain infra patellar on right and popliteal with weight bearing on right le standing and with gait  Strength/Myotome Testing     Left Hip   Planes of Motion   Flexion: 5  Extension: 5  Abduction: 5  Adduction: 4+    Right Hip   Planes of Motion   Flexion: 5  Extension: 4+  Abduction: 5  Adduction: 4+    Left Knee   Flexion: 5  Extension: 5    Right Knee   Flexion: 5  Extension: 4+    Left Ankle/Foot   Dorsiflexion: 5  Plantar flexion: 5    Right Ankle/Foot   Dorsiflexion: 5  Plantar flexion: 3+    Additional Strength Details  Abdominal strength 5/5 mmt,  Axial extension 4/5 ,  Trunk extensor strength 4/5 mmt , scapular strength 3+ /5 lower traps, and rhomboids ,     Ambulation     Ambulation: Level Surfaces   Ambulation without assistive device: independent    Additional Level Surfaces Ambulation Details  250 ft    Pt holding right knee in 15 degrees flexion during gait ,   Antalgic at this time       Ambulation: Stairs   Ascend stairs: independent  Pattern: reciprocal  Railings: without rails  Descend stairs: independent  Pattern: reciprocal  Railings: without rails             Precautions: allergies no known allergies, PMH: major depressive disorder, ADHD adult residual type, anxiety, c/o bilateral knee pains for years worse since covid pandemic due to decreased activity level per pt report      Manuals 8/30/21             BEBA gracia taping right knee for patellar fat pad relief progress to kinesiotaping  Man part of eval            Trigger point release c spine and bilateral upper traps , consider graston             Suboccipital myofascial release and manual neck traction              Neuro Re-Ed/ there exer              Chin tucks              theraband sh ext and rows,  Ext rotation , Y, punch outs ,              Quad, glut , add sets              slr x 4 b le's              Squats                                        Ther Ex                                                                                                                     Ther Activity                                       Gait Training                                       Modalities

## 2021-09-02 ENCOUNTER — OFFICE VISIT (OUTPATIENT)
Dept: PHYSICAL THERAPY | Age: 26
End: 2021-09-02
Payer: COMMERCIAL

## 2021-09-02 DIAGNOSIS — M22.2X1 PATELLOFEMORAL DISORDER OF RIGHT KNEE: ICD-10-CM

## 2021-09-02 DIAGNOSIS — M54.2 NECK PAIN: Primary | ICD-10-CM

## 2021-09-02 PROCEDURE — 97110 THERAPEUTIC EXERCISES: CPT

## 2021-09-02 PROCEDURE — 97140 MANUAL THERAPY 1/> REGIONS: CPT

## 2021-09-02 NOTE — PROGRESS NOTES
Daily Note     Today's date: 2021  Patient name: Beatriz Bosch  : 1995  MRN: [id number]  Referring provider: Jonna Lux MD  Dx:   Encounter Diagnosis     ICD-10-CM    1  Neck pain  M54 2    2  Patellofemoral disorder of right knee  M22 2X1                   Subjective: Pt's main c/o is B UT pain that radiates into B shoulders  Notes R knee pain with stair climbing, prolonged standing and squatting      Objective: See treatment diary below      Assessment: Tolerated treatment well  Spent session educating pt about both knee and neck pain as pt has some fear and anxiety about pain  Palpable spasms in B UT with L greater than R  Pt weak in quads and gluts as well as tight and that helped lead to R knee pain  Gave pt home program with handouts issued to work on as well as a cervical roll to try for sleep   Will monitor pt response and adjust ex a needed      Plan: Cont PT per LPT plan     Precautions: allergies no known allergies, PMH: major depressive disorder, ADHD adult residual type, anxiety, c/o bilateral knee pains for years worse since covid pandemic due to decreased activity level per pt report      Manuals 21            I adrienne gracia taping right knee for patellar fat pad relief progress to kinesiotaping  Man part of adrienne ziegler to B UT  10 min           Suboccipital myofascial release and manual neck traction   x10 reps           b UT /scalene stretch  [id number]           Neuro Re-Ed/ there exer              Chin tucks              theraband sh ext and rows,  Ext rotation , Y, punch outs ,   Rows [id number]           Quad, glut , add sets              Supine SLR  2x10           Supine hip abd  2x10                                     Ther Ex                                                                                                                     Ther Activity                                       Gait Training Modalities

## 2021-09-07 ENCOUNTER — APPOINTMENT (OUTPATIENT)
Dept: PHYSICAL THERAPY | Age: 26
End: 2021-09-07
Payer: COMMERCIAL

## 2021-09-09 ENCOUNTER — OFFICE VISIT (OUTPATIENT)
Dept: PHYSICAL THERAPY | Age: 26
End: 2021-09-09
Payer: COMMERCIAL

## 2021-09-09 DIAGNOSIS — M54.2 NECK PAIN: Primary | ICD-10-CM

## 2021-09-09 DIAGNOSIS — L70.9 ACNE, UNSPECIFIED ACNE TYPE: Primary | ICD-10-CM

## 2021-09-09 DIAGNOSIS — M22.2X1 PATELLOFEMORAL DISORDER OF RIGHT KNEE: ICD-10-CM

## 2021-09-09 PROCEDURE — 97110 THERAPEUTIC EXERCISES: CPT | Performed by: PHYSICAL THERAPIST

## 2021-09-09 PROCEDURE — 97140 MANUAL THERAPY 1/> REGIONS: CPT | Performed by: PHYSICAL THERAPIST

## 2021-09-09 NOTE — PROGRESS NOTES
Daily Note     Today's date: 2021  Patient name: Bernardino Bean  : 1995  MRN: [id number]  Referring provider: Esperanza Brooks MD  Dx:   Encounter Diagnosis     ICD-10-CM    1  Neck pain  M54 2    2  Patellofemoral disorder of right knee  M22 2X1                   Subjective: Patient reports that her knee pain is better since the exercises  Patient reports that her neck pain is severe with excessive computer use  Objective: See treatment diary below      Assessment: Tolerated treatment well  Patient has been following PT's recommendation and has been watching her posture while she's on the computer, has been doing yoga head circles with emphasis on extension to correct and alleviate her flexion bias throughout the day  Progressed program and HEP  Too fatigued to complete 20 reps of s/l hip abd d/t muscle fatigue and weakness  Has       Plan: Continue per plan of care        Precautions: allergies no known allergies, PMH: major depressive disorder, ADHD adult residual type, anxiety, c/o bilateral knee pains for years worse since covid pandemic due to decreased activity level per pt report      Manuals 21       I adrienne gracia taping right knee for patellar fat pad relief progress to kinesiotaping  Man part of adrienne ziegler to B UT  10 min x10' B      Suboccipital myofascial release and manual neck traction   x10 reps -      b UT /scalene stretch  [id number] -      Neuro Re-Ed/ there exer          Chin tucks    Reviewed technique for home      TB B ER   RTB 20x      TB rows and extension  Rows [id number] 10x:05 rows                  Bridges 20x      Supine SLR  2x10 20x      Supine hip abd  2x10 15x         SAQ 2# 10x :03               Ther Ex            UBE  2/2 x 6'                  Prone Is, Ts         Prone Ws                                          Ther Activity                           Gait Training                           Modalities

## 2021-09-14 ENCOUNTER — OFFICE VISIT (OUTPATIENT)
Dept: PHYSICAL THERAPY | Age: 26
End: 2021-09-14
Payer: COMMERCIAL

## 2021-09-14 DIAGNOSIS — M22.2X1 PATELLOFEMORAL DISORDER OF RIGHT KNEE: Primary | ICD-10-CM

## 2021-09-14 DIAGNOSIS — M54.2 NECK PAIN: ICD-10-CM

## 2021-09-14 PROCEDURE — 97110 THERAPEUTIC EXERCISES: CPT | Performed by: PHYSICAL THERAPIST

## 2021-09-14 PROCEDURE — 97140 MANUAL THERAPY 1/> REGIONS: CPT | Performed by: PHYSICAL THERAPIST

## 2021-09-14 NOTE — PROGRESS NOTES
Daily Note     Today's date: 2021  Patient name: Conner Solomon  : 1995  MRN: [id number]  Referring provider: Taylor Pineda MD  Dx:   Encounter Diagnosis     ICD-10-CM    1  Patellofemoral disorder of right knee  M22 2X1    2  Neck pain  M54 2                   Subjective: Patient reports that she has been on the computer all day today, feeling sore and tight in her neck  Patient does note that her knee is feeling better  Has been extending her neck to take the pressure off sitting on the computer all day  Objective: See treatment diary below      Assessment: Tolerated treatment well  Progressed program where able to tolerate  Moderately weak at knees with SAQ and SLR exercise, needing to rest between  Progressed program to add prone Is, and Ts, added to HEP as well  Plan: Continue per plan of care        Precautions: allergies no known allergies, PMH: major depressive disorder, ADHD adult residual type, anxiety, c/o bilateral knee pains for years worse since covid pandemic due to decreased activity level per pt report      Manuals 21      I adrienne gracia taping right knee for patellar fat pad relief progress to kinesiotaping  Man part of adrienne ziegler to B UT  10 min x10' B x10' B     Suboccipital myofascial release and manual neck traction   x10 reps -      b UT /scalene stretch  [id number] -      Neuro Re-Ed/ there exer          Chin tucks    Reviewed technique for home      TB B ER   RTB 20x -     TB rows and extension  Rows [id number] 10x:05 rows -                 Bridges 20x 20x     Supine SLR  2x10 20x 20x     Supine hip abd  2x10 15x 20x        SAQ 2# 10x :03 2# 15x :03              Ther Ex            UBE  2/2 x 6' 2/2 x8'                 Prone Is, Ts 20x ea        Prone Ws                                          Ther Activity                           Gait Training                           Modalities

## 2021-09-16 ENCOUNTER — OFFICE VISIT (OUTPATIENT)
Dept: PHYSICAL THERAPY | Age: 26
End: 2021-09-16
Payer: COMMERCIAL

## 2021-09-16 DIAGNOSIS — M22.2X1 PATELLOFEMORAL DISORDER OF RIGHT KNEE: Primary | ICD-10-CM

## 2021-09-16 DIAGNOSIS — M54.2 NECK PAIN: ICD-10-CM

## 2021-09-16 PROCEDURE — 97140 MANUAL THERAPY 1/> REGIONS: CPT | Performed by: PHYSICAL THERAPIST

## 2021-09-16 PROCEDURE — 97110 THERAPEUTIC EXERCISES: CPT | Performed by: PHYSICAL THERAPIST

## 2021-09-16 NOTE — PROGRESS NOTES
Daily Note     Today's date: 2021  Patient name: Zainab Ku  : 1995  MRN: [id number]  Referring provider: John Whitfield MD  Dx:   Encounter Diagnosis     ICD-10-CM    1  Patellofemoral disorder of right knee  M22 2X1    2  Neck pain  M54 2                   Subjective: Patient reports that she is feeling less neck pain, but has been having low back tightness with flexion type postures and pain into her shoulder blades when she flexes her head/neck  Objective: See treatment diary below      Assessment: Tolerated treatment well  Patient making great progress with posture, still feels challenged with LE strengthening, feels tightness in cervical spine with UBE, encouraging patient to relax cervical spine and to use UE strength, feeling muscle fatigue at biceps, triceps and rhomboids area  Plan: Continue per plan of care  Precautions: allergies no known allergies, PMH: major depressive disorder, ADHD adult residual type, anxiety, c/o bilateral knee pains for years worse since covid pandemic due to decreased activity level per pt report      Manuals 21     I adrienne gracia taping right knee for patellar fat pad relief progress to kinesiotaping  Man part of adrienne ziegler to B UT  10 min x10' B x10' B x10' B    Suboccipital myofascial release and manual neck traction   x10 reps -      b UT /scalene stretch  [id number] -      Neuro Re-Ed/ there exer          Chin tucks    Reviewed technique for home      TB B ER   RTB 20x - DKTC NV    TB rows and extension  Rows [id number] 10x:05 rows - LTR NV         Seated p ball flex, diagonals NV         TA ball press                         Bridges 20x 20x 20x    Supine SLR  2x10 20x 20x 20x , 17x on R fatigued D/c to HEP   Supine hip abd  2x10 15x 20x 14x on L, 20x on R D/C to HEP      SAQ 2# 10x :03 2# 15x :03 2 5# 15x :03                  Leg press NV?          Knee extension         Hip abd         Ham curls Ther Ex            UBE  2/2 x 6' 2/2 x8' 2/2 x 10'                Prone Is, Ts 20x ea NT       Prone Ws                                          Ther Activity                           Gait Training                           Modalities

## 2021-09-21 ENCOUNTER — APPOINTMENT (OUTPATIENT)
Dept: PHYSICAL THERAPY | Age: 26
End: 2021-09-21
Payer: COMMERCIAL

## 2021-09-23 ENCOUNTER — OFFICE VISIT (OUTPATIENT)
Dept: PHYSICAL THERAPY | Age: 26
End: 2021-09-23
Payer: COMMERCIAL

## 2021-09-23 DIAGNOSIS — M54.2 NECK PAIN: ICD-10-CM

## 2021-09-23 DIAGNOSIS — M22.2X1 PATELLOFEMORAL DISORDER OF RIGHT KNEE: Primary | ICD-10-CM

## 2021-09-23 PROCEDURE — 97110 THERAPEUTIC EXERCISES: CPT | Performed by: PHYSICAL THERAPIST

## 2021-09-23 PROCEDURE — 97140 MANUAL THERAPY 1/> REGIONS: CPT | Performed by: PHYSICAL THERAPIST

## 2021-09-23 NOTE — PROGRESS NOTES
Daily Note     Today's date: 2021  Patient name: Pablo Yost  : 1995  MRN: [id number]  Referring provider: Patricio Sweet MD  Dx:   Encounter Diagnosis     ICD-10-CM    1  Patellofemoral disorder of right knee  M22 2X1    2  Neck pain  M54 2                   Subjective: Patient reports that she is more aware of her posture in sitting  Has been having more thoracic pain in general, feel the challenge with strengthening in PT  Reports that her knee pain has been less and feels that she's only having crepitus, but no pain  Objective: See treatment diary below      Assessment: Tolerated treatment well  Added thoracic mobs Grade II, III all levels, painful at T10-12 range, alleviated with sustained DKTC stretch x 2 min  Added thoraco-lumbar stretches to program today, all feeling a good stretch to spine musculature  Continued with Graston for neck pain as patient feels this helps with her neck soreness with studying  Patient is much more aware of her posture in sitting and during her studies, and has less neck pain as a result  Progressed to leg machines and additional stretches/exercises  Good tolerance to leg machines, no increase in pain after therapy  Plan: Continue per plan of care        Precautions: allergies no known allergies, PMH: major depressive disorder, ADHD adult residual type, anxiety, c/o bilateral knee pains for years worse since covid pandemic due to decreased activity level per pt report      Manuals 21    I adrienne gracia taping right knee for patellar fat pad relief progress to kinesiotaping  Man part of eval     t-spine mobs Grade II, III; painful at T10-12 levels    karlie to TORY UT  10 min x10' B x10' B x10' B x10' B   Suboccipital myofascial release and manual neck traction   x10 reps -      b UT /scalene stretch  [id number] -      Neuro Re-Ed/ there exer          Chin tucks    Reviewed technique for home      TB B ER   RTB 20x - DKTC NV x2'   TB rows and extension  Rows [id number] 10x:05 rows - LTR NV With arms out 3x:30        Seated p ball flex, diagonals NV 10x:10 flexion only today        TA ball press 10x :05                        Bridges 20x 20x 20x NT   Supine SLR  2x10 20x 20x 20x , 17x on R fatigued D/c to HEP   Supine hip abd  2x10 15x 20x 14x on L, 20x on R D/C to HEP      SAQ 2# 10x :03 2# 15x :03 2 5# 15x :03                  Leg press NV? (back #6) 50# 20x        Knee extension 25# 20x        Hip abd 35# 20x        Ham curls 40# 20x            Ther Ex            UBE  2/2 x 6' 2/2 x8' 2/2 x 10' NT               Prone Is, Ts 20x ea NT 15x      Prone Ws   NT                                       Ther Activity                           Gait Training                           Modalities

## 2021-09-28 ENCOUNTER — OFFICE VISIT (OUTPATIENT)
Dept: PHYSICAL THERAPY | Age: 26
End: 2021-09-28
Payer: COMMERCIAL

## 2021-09-28 DIAGNOSIS — M22.2X1 PATELLOFEMORAL DISORDER OF RIGHT KNEE: Primary | ICD-10-CM

## 2021-09-28 DIAGNOSIS — M54.2 NECK PAIN: ICD-10-CM

## 2021-09-28 PROCEDURE — 97110 THERAPEUTIC EXERCISES: CPT

## 2021-09-28 PROCEDURE — 97140 MANUAL THERAPY 1/> REGIONS: CPT

## 2021-09-28 NOTE — PROGRESS NOTES
Daily Note     Today's date: 2021  Patient name: Dallas Granados  : 1995  MRN: [id number]  Referring provider: Bakari Cortez MD  Dx:   Encounter Diagnosis     ICD-10-CM    1  Patellofemoral disorder of right knee  M22 2X1    2  Neck pain  M54 2                   Subjective: Patient reports that her L UT pain is more sensitive and painful today  Pt feels maybe it was firm her workout  Pt has returned to working out at gym but notes she cannot do some of the advanced ex she would like to do because her R knee hurts      Objective: See treatment diary below      Assessment: Tolerated treatment well  Continued with Graston for neck pain as patient feels this helps with her neck soreness with studying  Pt's scapular stabilizers are weak and cannot support her posture for long lengths with studying and needs to improve strength in that area  Also stressed her LE strength jacobo on L is not strong enough to support advanced ex and pt should concentrate on B LE strengthening to provide better support and to eventually aid in return to more upper level activites      Plan: Continue per plan of care        Precautions: allergies no known allergies, PMH: major depressive disorder, ADHD adult residual type, anxiety, c/o bilateral knee pains for years worse since covid pandemic due to decreased activity level per pt report      Manuals     BEBA gracia taping right knee for patellar fat pad relief progress to kinesiotaping       t-spine mobs Grade II, III; painful at T10-12 levels    graston to B UT 10min 10 min x10' B x10' B x10' B x10' B   Suboccipital myofascial release and manual neck traction   x10 reps -      b UT /scalene stretch  [id number] -      Neuro Re-Ed/ there exer          Chin tucks    Reviewed technique for home      TB B ER   RTB 20x - DKTC NV x2'   TB rows and extension  Rows [id number] 10x:05 rows - LTR NV With arms out 3x:30        Seated p ball flex, diagonals NV 10x:10 flexion only today        TA ball press 10x :05                        Bridges 20x 20x 20x NT   Supine SLR  2x10 20x 20x 20x , 17x on R fatigued D/c to HEP   Supine hip abd  2x10 15x 20x 14x on L, 20x on R D/C to HEP      SAQ 2# 10x :03 2# 15x :03 2 5# 15x :03             Leg press # 90 3x10    Leg press NV? (back #6) 50# 20x   Knee ext # 25 2x10    Knee extension 25# 20x   Hip abd # 35 2x10    Hip abd 35# 20x   Cybex hamcurls # 40 2x10    Ham curls 40# 20x            Ther Ex         UBE 10 min 90 RPM  UBE  2/2 x 6' 2/2 x8' 2/2 x 10' NT   Prone I,w,T 10-20        Prone rows 2x10  Prone Is, Ts 20x ea NT 15x      Prone Ws   NT                                       Ther Activity                           Gait Training                           Modalities

## 2021-09-30 ENCOUNTER — OFFICE VISIT (OUTPATIENT)
Dept: PHYSICAL THERAPY | Age: 26
End: 2021-09-30
Payer: COMMERCIAL

## 2021-09-30 DIAGNOSIS — M22.2X1 PATELLOFEMORAL DISORDER OF RIGHT KNEE: Primary | ICD-10-CM

## 2021-09-30 DIAGNOSIS — M54.2 NECK PAIN: ICD-10-CM

## 2021-09-30 PROCEDURE — 97140 MANUAL THERAPY 1/> REGIONS: CPT

## 2021-09-30 PROCEDURE — 97110 THERAPEUTIC EXERCISES: CPT

## 2021-09-30 NOTE — PROGRESS NOTES
Daily Note     Today's date: 2021  Patient name: Yessenai Pierre  : 1995  MRN: [id number]  Referring provider: Annalisa Beyer MD  Dx:   Encounter Diagnosis     ICD-10-CM    1  Patellofemoral disorder of right knee  M22 2X1    2  Neck pain  M54 2                   Subjective: Patient reports soreness all over her body from working out at gym  Pt especially notes her LBP hurts from doing crunches  Objective: See treatment diary below      Assessment: Tolerated treatment well  Continued with Graston for neck pain as patient feels this helps with her neck soreness with studying  Pt's scapular stabilizers are weak and cannot support her posture for long lengths with studying and needs to improve strength in that area  Added LB bends to counter act sitting and flexion with workouts so it helps limit LBP  Pt should concentrate on B LE strengthening and scapular strengthening to provide better support and to eventually aid in return to more upper level activites      Plan: Continue per plan of care        Precautions: allergies no known allergies, PMH: major depressive disorder, ADHD adult residual type, anxiety, c/o bilateral knee pains for years worse since covid pandemic due to decreased activity level per pt report      Manuals     I adrienne gracia taping right knee for patellar fat pad relief progress to kinesiotaping       t-spine mobs Grade II, III; painful at T10-12 levels    graston to B UT 10min 10 min x10' B x10' B x10' B x10' B   Suboccipital myofascial release and manual neck traction    -      b UT /scalene stretch   -      Neuro Re-Ed/ there exer          Chin tucks    Reviewed technique for home      TB B ER   RTB 20x - DKTC NV x2'   TB rows and extension   10x:05 rows - LTR NV With arms out 3x:30        Seated p ball flex, diagonals NV 10x:10 flexion only today        TA ball press 10x :05                        Bridges 20x 20x 20x NT   Supine SLR   20x 20x 20x , 17x on R fatigued D/c to HEP   Supine hip abd   15x 20x 14x on L, 20x on R D/C to HEP      SAQ 2# 10x :03 2# 15x :03 2 5# 15x :03             Leg press # 90 3x10 95# 3x10   Leg press NV? (back #6) 50# 20x   Knee ext # 25 2x10 25# 2x10   Knee extension 25# 20x   Hip abd # 35 2x10 35# 3x10   Hip abd 35# 20x   Cybex hamcurls # 40 2x10 40# 3x15   Ham curls 40# 20x            Ther Ex         UBE 10 min 90 RPM 10 min 90 RPM UBE  2/2 x 6' 2/2 x8' 2/2 x 10' NT   Prone I,w,T 10-20 x15       Prone rows 2x10 x15 Prone Is, Ts 20x ea NT 15x   Press ups  2x15 Prone Ws   NT   standing back bends with fulcrum  2x10                                  Ther Activity                           Gait Training                           Modalities

## 2021-10-05 ENCOUNTER — EVALUATION (OUTPATIENT)
Dept: PHYSICAL THERAPY | Age: 26
End: 2021-10-05
Payer: COMMERCIAL

## 2021-10-05 DIAGNOSIS — M54.2 NECK PAIN: ICD-10-CM

## 2021-10-05 DIAGNOSIS — M22.2X1 PATELLOFEMORAL DISORDER OF RIGHT KNEE: Primary | ICD-10-CM

## 2021-10-05 PROCEDURE — 97110 THERAPEUTIC EXERCISES: CPT | Performed by: PHYSICAL THERAPIST

## 2021-10-07 ENCOUNTER — OFFICE VISIT (OUTPATIENT)
Dept: PHYSICAL THERAPY | Age: 26
End: 2021-10-07
Payer: COMMERCIAL

## 2021-10-07 DIAGNOSIS — M22.2X1 PATELLOFEMORAL DISORDER OF RIGHT KNEE: Primary | ICD-10-CM

## 2021-10-07 DIAGNOSIS — M54.2 NECK PAIN: ICD-10-CM

## 2021-10-07 PROCEDURE — 97110 THERAPEUTIC EXERCISES: CPT | Performed by: PHYSICAL THERAPIST

## 2021-10-12 ENCOUNTER — OFFICE VISIT (OUTPATIENT)
Dept: PHYSICAL THERAPY | Age: 26
End: 2021-10-12
Payer: COMMERCIAL

## 2021-10-12 DIAGNOSIS — M22.2X1 PATELLOFEMORAL DISORDER OF RIGHT KNEE: Primary | ICD-10-CM

## 2021-10-12 DIAGNOSIS — M54.2 NECK PAIN: ICD-10-CM

## 2021-10-12 PROCEDURE — 97110 THERAPEUTIC EXERCISES: CPT

## 2021-10-14 ENCOUNTER — OFFICE VISIT (OUTPATIENT)
Dept: PHYSICAL THERAPY | Age: 26
End: 2021-10-14
Payer: COMMERCIAL

## 2021-10-14 DIAGNOSIS — M54.2 NECK PAIN: ICD-10-CM

## 2021-10-14 DIAGNOSIS — M22.2X1 PATELLOFEMORAL DISORDER OF RIGHT KNEE: Primary | ICD-10-CM

## 2021-10-14 PROCEDURE — 97110 THERAPEUTIC EXERCISES: CPT | Performed by: PHYSICAL THERAPIST

## 2021-10-28 ENCOUNTER — OFFICE VISIT (OUTPATIENT)
Dept: PHYSICAL THERAPY | Age: 26
End: 2021-10-28
Payer: COMMERCIAL

## 2021-10-28 ENCOUNTER — APPOINTMENT (OUTPATIENT)
Dept: PHYSICAL THERAPY | Age: 26
End: 2021-10-28
Payer: COMMERCIAL

## 2021-10-28 DIAGNOSIS — M22.2X1 PATELLOFEMORAL DISORDER OF RIGHT KNEE: Primary | ICD-10-CM

## 2021-10-28 DIAGNOSIS — M54.2 NECK PAIN: ICD-10-CM

## 2021-10-28 PROCEDURE — 97110 THERAPEUTIC EXERCISES: CPT | Performed by: PHYSICAL THERAPIST

## 2021-12-21 ENCOUNTER — TELEPHONE (OUTPATIENT)
Dept: PSYCHIATRY | Facility: CLINIC | Age: 26
End: 2021-12-21

## 2023-06-19 ENCOUNTER — OFFICE VISIT (OUTPATIENT)
Dept: URGENT CARE | Age: 28
End: 2023-06-19
Payer: MEDICARE

## 2023-06-19 ENCOUNTER — TELEPHONE (OUTPATIENT)
Dept: FAMILY MEDICINE CLINIC | Facility: CLINIC | Age: 28
End: 2023-06-19

## 2023-06-19 VITALS
SYSTOLIC BLOOD PRESSURE: 110 MMHG | OXYGEN SATURATION: 98 % | HEART RATE: 103 BPM | DIASTOLIC BLOOD PRESSURE: 66 MMHG | TEMPERATURE: 99 F | RESPIRATION RATE: 12 BRPM

## 2023-06-19 DIAGNOSIS — J06.9 ACUTE URI: Primary | ICD-10-CM

## 2023-06-19 DIAGNOSIS — J02.9 SORE THROAT: ICD-10-CM

## 2023-06-19 DIAGNOSIS — J02.9 ACUTE VIRAL PHARYNGITIS: Primary | ICD-10-CM

## 2023-06-19 LAB — S PYO AG THROAT QL: NEGATIVE

## 2023-06-19 PROCEDURE — 99213 OFFICE O/P EST LOW 20 MIN: CPT

## 2023-06-19 PROCEDURE — 87880 STREP A ASSAY W/OPTIC: CPT

## 2023-06-19 PROCEDURE — 87070 CULTURE OTHR SPECIMN AEROBIC: CPT

## 2023-06-19 RX ORDER — GUAIFENESIN/DEXTROMETHORPHAN 100-10MG/5
5 SYRUP ORAL 3 TIMES DAILY PRN
Qty: 118 ML | Refills: 1 | Status: SHIPPED | OUTPATIENT
Start: 2023-06-19

## 2023-06-19 RX ORDER — AMOXICILLIN 500 MG/1
500 CAPSULE ORAL EVERY 8 HOURS SCHEDULED
Qty: 30 CAPSULE | Refills: 0 | Status: SHIPPED | OUTPATIENT
Start: 2023-06-19 | End: 2023-06-29

## 2023-06-19 NOTE — TELEPHONE ENCOUNTER
Patient c/o she lost voice, has a terrible sore throat/tonsils, and its has been getting worse  X 3-4 days  She is asking if you can call antibiotics in for her  Her follow up with you is in July  Please advise

## 2023-06-19 NOTE — PROGRESS NOTES
"  [facility]        NAME: Jos No is a 32 y o  female  : 1995    MRN: [id number]  DATE: 2023  TIME: 2:29 PM    Assessment and Plan   Acute viral pharyngitis [J02 9]  1  Acute viral pharyngitis  Throat culture      2  Sore throat  POCT rapid strepA    Throat culture        Strep test negative, will send throat culture  Patient declining COVID/flu testing  Discussed with patient that antibiotics are not warranted at this time as symptoms more consistently associated with a viral infection, but may start antibiotics if throat culture positive and/or symptoms persist beyond 72 hours with use of over-the-counter products  Patient agreeable to plan and verbalizes understanding  Patient Instructions     Strep test negative, will send throat culture and follow-up if positive  Symptoms likely associated with a viral infection  Continue over-the-counter products as needed for symptoms: tylenol for fevers, ibuprofen for body aches, Cepacol for throat discomfort, Flonase with nasal saline and sudafed for congestion, mucinex-dm for cough  May start antibiotics if no improvement of symptoms within 72 hours and/or throat culture is positive  Follow-up with PCP in 3-5 days  Report to the ER if symptoms worsen  Chief Complaint     Chief Complaint   Patient presents with   • Sore Throat     For 4 days; loss of voice as well; PCP ordered antibiotics today but patient did not          History of Present Illness       32year old female presents for evaluation of fever, cough, congestion, and sore throat for the past 4 days  She denies any known sick contacts or triggers or history of environmental allergies  She relates her symptoms started with a sore throat but she has developed a cough, congestion, and body aches over the past few days  She relates a history of \"frequent tonsil infections\" and called her PCP who prescribed her Amoxicllin but she has not yet picked up the medication   " She has tried dayquil and nyquil for symptoms with minimal improvement  Sore Throat   This is a new problem  The current episode started in the past 7 days  The problem has been unchanged  Neither side of throat is experiencing more pain than the other  There has been no fever  The pain is mild  Associated symptoms include congestion, coughing, ear pain, headaches, a hoarse voice, neck pain, swollen glands and trouble swallowing  Pertinent negatives include no abdominal pain, diarrhea, drooling, ear discharge, plugged ear sensation, shortness of breath, stridor or vomiting  She has had no exposure to strep or mono  Treatments tried: dayquil/nyquil  The treatment provided no relief  Review of Systems   Review of Systems   Constitutional: Positive for fatigue  Negative for activity change, appetite change, chills and fever  HENT: Positive for congestion, ear pain, hoarse voice, postnasal drip, rhinorrhea, sore throat, trouble swallowing and voice change  Negative for drooling, ear discharge, sinus pressure, sinus pain and sneezing  Eyes: Negative for visual disturbance  Respiratory: Positive for cough  Negative for chest tightness, shortness of breath and stridor  Cardiovascular: Negative for chest pain and palpitations  Gastrointestinal: Negative for abdominal pain, constipation, diarrhea, nausea and vomiting  Musculoskeletal: Positive for neck pain  Negative for arthralgias and back pain  Skin: Negative for color change  Allergic/Immunologic: Negative for environmental allergies and food allergies  Neurological: Positive for headaches  Negative for dizziness and light-headedness           Current Medications       Current Outpatient Medications:   •  acetaminophen (TYLENOL) 325 mg tablet, Take 650 mg by mouth every 6 (six) hours as needed for mild pain, Disp: , Rfl:   •  amoxicillin (AMOXIL) 500 mg capsule, Take 1 capsule (500 mg total) by mouth every 8 (eight) hours for 10 days, Disp: 30 capsule, Rfl: 0  •  amphetamine-dextroamphetamine (ADDERALL) 10 mg tablet, Take 1 tablet (10 mg total) by mouth daily With Food/Breakfast  Kirstie Degree Max Daily Amount: 10 mg (Patient not taking: Reported on 7/12/2021), Disp: 30 tablet, Rfl: 0  •  dextromethorphan-guaiFENesin (ROBITUSSIN DM)  mg/5 mL syrup, Take 5 mL by mouth 3 (three) times a day as needed for cough or congestion, Disp: 118 mL, Rfl: 1  •  ergocalciferol (VITAMIN D2) 50,000 units, TAKE 1 CAPSULE BY MOUTH 1 TIME A WEEK (Patient not taking: Reported on 7/12/2021), Disp: 4 capsule, Rfl: 3  •  levonorgestrel (MIRENA) 20 MCG/24HR IUD, 1 each by Intrauterine route once, Disp: , Rfl:   •  meloxicam (MOBIC) 15 mg tablet, Take 1 tablet (15 mg total) by mouth daily With food/Breakfast (Patient not taking: Reported on 7/12/2021), Disp: 30 tablet, Rfl: 2    Current Allergies     Allergies as of 06/19/2023   • (No Known Allergies)            The following portions of the patient's history were reviewed and updated as appropriate: allergies, current medications, past family history, past medical history, past social history, past surgical history and problem list      History reviewed  No pertinent past medical history  History reviewed  No pertinent surgical history  Family History   Problem Relation Age of Onset   • No Known Problems Mother    • No Known Problems Father          Medications have been verified  Objective   /66 (BP Location: Left arm, Patient Position: Sitting, Cuff Size: Large)   Pulse 103   Temp 99 °F (37 2 °C) (Temporal)   Resp 12   SpO2 98%        Physical Exam     Physical Exam  Vitals and nursing note reviewed  Constitutional:       General: She is awake  Appearance: Normal appearance  She is well-developed and normal weight  HENT:      Head: Normocephalic and atraumatic  Right Ear: Hearing, ear canal and external ear normal  No middle ear effusion  Tympanic membrane is erythematous   Tympanic membrane is not retracted or bulging  Tympanic membrane has normal mobility  Left Ear: Hearing, ear canal and external ear normal   No middle ear effusion  Tympanic membrane is erythematous  Tympanic membrane is not retracted or bulging  Tympanic membrane has normal mobility  Nose: Congestion and rhinorrhea present  Rhinorrhea is clear  Right Turbinates: Not enlarged, swollen or pale  Left Turbinates: Not enlarged, swollen or pale  Right Sinus: No maxillary sinus tenderness or frontal sinus tenderness  Left Sinus: No maxillary sinus tenderness or frontal sinus tenderness  Mouth/Throat:      Lips: Pink  Mouth: Mucous membranes are moist       Pharynx: Uvula midline  Pharyngeal swelling, posterior oropharyngeal erythema and uvula swelling present  No oropharyngeal exudate  Tonsils: No tonsillar exudate or tonsillar abscesses  1+ on the right  1+ on the left  Eyes:      General: Vision grossly intact  Extraocular Movements:      Right eye: Normal extraocular motion  Left eye: Normal extraocular motion  Conjunctiva/sclera: Conjunctivae normal       Pupils: Pupils are equal, round, and reactive to light  Cardiovascular:      Rate and Rhythm: Regular rhythm  Tachycardia present  Pulses: Normal pulses  Heart sounds: Normal heart sounds  Pulmonary:      Effort: Pulmonary effort is normal       Breath sounds: Normal breath sounds  Musculoskeletal:      Cervical back: Full passive range of motion without pain, normal range of motion and neck supple  Lymphadenopathy:      Cervical: Cervical adenopathy present  Skin:     General: Skin is warm and dry  Capillary Refill: Capillary refill takes less than 2 seconds  Neurological:      General: No focal deficit present  Mental Status: She is alert and oriented to person, place, and time  Psychiatric:         Mood and Affect: Mood normal          Behavior: Behavior normal  Behavior is cooperative

## 2023-06-19 NOTE — PATIENT INSTRUCTIONS
Strep test negative, will send throat culture and follow-up if positive  Symptoms likely associated with a viral infection  Continue over-the-counter products as needed for symptoms: tylenol for fevers, ibuprofen for body aches, Cepacol for throat discomfort, Flonase with nasal saline and sudafed for congestion, mucinex-dm for cough  May start antibiotics if no improvement of symptoms within 72 hours and/or throat culture is positive  Follow-up with PCP in 3-5 days  Report to the ER if symptoms worsen

## 2023-06-21 LAB — BACTERIA THROAT CULT: NORMAL

## 2023-07-24 ENCOUNTER — OFFICE VISIT (OUTPATIENT)
Dept: FAMILY MEDICINE CLINIC | Facility: CLINIC | Age: 28
End: 2023-07-24
Payer: MEDICARE

## 2023-07-24 VITALS
WEIGHT: 149 LBS | OXYGEN SATURATION: 99 % | HEIGHT: 70 IN | TEMPERATURE: 99.1 F | DIASTOLIC BLOOD PRESSURE: 78 MMHG | BODY MASS INDEX: 21.33 KG/M2 | RESPIRATION RATE: 14 BRPM | SYSTOLIC BLOOD PRESSURE: 106 MMHG | HEART RATE: 72 BPM

## 2023-07-24 DIAGNOSIS — Z00.01 ENCOUNTER FOR GENERAL ADULT MEDICAL EXAMINATION WITH ABNORMAL FINDINGS: Primary | ICD-10-CM

## 2023-07-24 DIAGNOSIS — L98.9 SKIN LESIONS: ICD-10-CM

## 2023-07-24 DIAGNOSIS — R94.31 ABNORMAL ECG: ICD-10-CM

## 2023-07-24 DIAGNOSIS — Z12.4 SCREENING FOR CERVICAL CANCER: ICD-10-CM

## 2023-07-24 DIAGNOSIS — Z01.419 WOMEN'S ANNUAL ROUTINE GYNECOLOGICAL EXAMINATION: ICD-10-CM

## 2023-07-24 PROBLEM — F33.1 MDD (MAJOR DEPRESSIVE DISORDER), RECURRENT EPISODE, MODERATE (HCC): Status: RESOLVED | Noted: 2020-06-18 | Resolved: 2023-07-24

## 2023-07-24 LAB
SL AMB  POCT GLUCOSE, UA: ABNORMAL
SL AMB LEUKOCYTE ESTERASE,UA: ABNORMAL
SL AMB POCT BILIRUBIN,UA: ABNORMAL
SL AMB POCT BLOOD,UA: ABNORMAL
SL AMB POCT CLARITY,UA: CLEAR
SL AMB POCT COLOR,UA: YELLOW
SL AMB POCT HEMOGLOBIN AIC: 5.1 (ref ?–6.5)
SL AMB POCT KETONES,UA: ABNORMAL
SL AMB POCT NITRITE,UA: ABNORMAL
SL AMB POCT PH,UA: 6
SL AMB POCT SPECIFIC GRAVITY,UA: 1.02
SL AMB POCT URINE PROTEIN: ABNORMAL
SL AMB POCT UROBILINOGEN: 0.2

## 2023-07-24 PROCEDURE — 93000 ELECTROCARDIOGRAM COMPLETE: CPT | Performed by: INTERNAL MEDICINE

## 2023-07-24 PROCEDURE — 99395 PREV VISIT EST AGE 18-39: CPT | Performed by: INTERNAL MEDICINE

## 2023-07-24 PROCEDURE — 81002 URINALYSIS NONAUTO W/O SCOPE: CPT | Performed by: INTERNAL MEDICINE

## 2023-07-24 PROCEDURE — 83036 HEMOGLOBIN GLYCOSYLATED A1C: CPT | Performed by: INTERNAL MEDICINE

## 2023-07-24 NOTE — PROGRESS NOTES
BMI Counseling: Body mass index is 21.53 kg/m². The BMI is below normal, advised to increase po calories intake,. Stephanie Jose

## 2023-07-24 NOTE — PROGRESS NOTES
Name: Jovani Uribe      : 1995      MRN: [id number]  Encounter Provider: Surya Mckeon MD  Encounter Date: 2023   Encounter department: [address] [place] [address]     1. Encounter for general adult medical examination with abnormal findings  -     POCT hemoglobin A1c  -     POCT urine dip  -     POCT ECG  -     Lipase; Future  -     Ferritin; Future  -     UA (URINE) with reflex to Scope; Future; Expected date: 2023  -     Comprehensive metabolic panel; Future  -     CBC and differential; Future  -     Lipid panel; Future  -     TSH, 3rd generation; Future; Expected date: 2023  -     T4, free; Future; Expected date: 2023  -     Vitamin B12; Future  -     Vitamin D 25 hydroxy; Future; Expected date: 2023    2. Screening for cervical cancer    3. Women's annual routine gynecological examination  -     Ambulatory Referral to Gynecology; Future    4. Skin lesions  -     Ambulatory Referral to Dermatology; Future    5. Abnormal ECG  -     Holter monitor; Future; Expected date: 2023  -     Echo complete w/ contrast if indicated; Future; Expected date: 2023     Annual Physical exam : Done in detail. .  RTC in 2-3 mos w Blood work       Subjective      32 Y O lady is here for Annual Physical exam and Regular check up, she feels fair, no recent Blood work, no meds,... Review of Systems   Constitutional: Negative for chills, fatigue and fever. HENT: Positive for postnasal drip. Negative for congestion, facial swelling, sore throat, trouble swallowing and voice change. Eyes: Negative for pain, discharge and visual disturbance. Respiratory: Negative for cough, shortness of breath and wheezing. Cardiovascular: Negative for chest pain, palpitations and leg swelling. Gastrointestinal: Negative for abdominal pain, blood in stool, constipation, diarrhea and nausea.    Endocrine: Negative for polydipsia, polyphagia and polyuria. Genitourinary: Negative for difficulty urinating, hematuria and urgency. Musculoskeletal: Negative for arthralgias and myalgias. Skin: Negative for rash. Neurological: Negative for dizziness, tremors, weakness and headaches. Hematological: Negative for adenopathy. Does not bruise/bleed easily. Psychiatric/Behavioral: Negative for dysphoric mood, sleep disturbance and suicidal ideas. Current Outpatient Medications on File Prior to Visit   Medication Sig   • acetaminophen (TYLENOL) 325 mg tablet Take 650 mg by mouth every 6 (six) hours as needed for mild pain   • [DISCONTINUED] levonorgestrel (MIRENA) 20 MCG/24HR IUD 1 each by Intrauterine route once   • [DISCONTINUED] amphetamine-dextroamphetamine (ADDERALL) 10 mg tablet Take 1 tablet (10 mg total) by mouth daily With Food/Breakfast.. Vivica Lento Max Daily Amount: 10 mg (Patient not taking: Reported on 7/12/2021)   • [DISCONTINUED] dextromethorphan-guaiFENesin (ROBITUSSIN DM)  mg/5 mL syrup Take 5 mL by mouth 3 (three) times a day as needed for cough or congestion (Patient not taking: Reported on 7/24/2023)   • [DISCONTINUED] ergocalciferol (VITAMIN D2) 50,000 units TAKE 1 CAPSULE BY MOUTH 1 TIME A WEEK (Patient not taking: No sig reported)   • [DISCONTINUED] meloxicam (MOBIC) 15 mg tablet Take 1 tablet (15 mg total) by mouth daily With food/Breakfast (Patient not taking: Reported on 7/12/2021)       Objective     /78 (BP Location: Left arm, Patient Position: Sitting, Cuff Size: Standard)   Pulse 72   Temp 99.1 °F (37.3 °C) (Tympanic)   Resp 14   Ht 5' 9.75" (1.772 m)   Wt 67.6 kg (149 lb)   SpO2 99%   BMI 21.53 kg/m²     Physical Exam  Constitutional:       General: She is not in acute distress. HENT:      Head: Normocephalic. Mouth/Throat:      Pharynx: No oropharyngeal exudate. Eyes:      General: No scleral icterus. Conjunctiva/sclera: Conjunctivae normal.      Pupils: Pupils are equal, round, and reactive to light. Neck:      Thyroid: No thyromegaly. Cardiovascular:      Rate and Rhythm: Normal rate and regular rhythm. Heart sounds: Normal heart sounds. No murmur heard. Pulmonary:      Effort: Pulmonary effort is normal. No respiratory distress. Breath sounds: Normal breath sounds. No wheezing or rales. Abdominal:      General: Bowel sounds are normal. There is no distension. Palpations: Abdomen is soft. Tenderness: There is no abdominal tenderness. There is no guarding or rebound. Musculoskeletal:         General: No tenderness. Cervical back: Neck supple. Lymphadenopathy:      Cervical: No cervical adenopathy. Skin:     Coloration: Skin is not pale. Findings: Lesion present. No rash. Neurological:      Mental Status: She is alert and oriented to person, place, and time. Sensory: No sensory deficit. Motor: No weakness.        Asher Hester MD

## 2023-08-30 ENCOUNTER — HOSPITAL ENCOUNTER (OUTPATIENT)
Dept: NON INVASIVE DIAGNOSTICS | Facility: CLINIC | Age: 28
Discharge: HOME | End: 2023-08-30
Payer: MEDICARE

## 2023-08-30 VITALS
WEIGHT: 149 LBS | DIASTOLIC BLOOD PRESSURE: 78 MMHG | BODY MASS INDEX: 21.33 KG/M2 | SYSTOLIC BLOOD PRESSURE: 106 MMHG | HEIGHT: 70 IN | HEART RATE: 83 BPM

## 2023-08-30 DIAGNOSIS — R94.31 ABNORMAL ECG: ICD-10-CM

## 2023-08-30 LAB
AORTIC ROOT: 2.8 CM
AORTIC VALVE MEAN VELOCITY: 10.3 M/S
APICAL FOUR CHAMBER EJECTION FRACTION: 61 %
ASCENDING AORTA: 2.9 CM
AV AREA BY CONTINUOUS VTI: 1.7 CM2
AV AREA PEAK VELOCITY: 1.8 CM2
AV LVOT MEAN GRADIENT: 3 MMHG
AV LVOT PEAK GRADIENT: 4 MMHG
AV MEAN GRADIENT: 5 MMHG
AV PEAK GRADIENT: 9 MMHG
AV VALVE AREA: 1.72 CM2
AV VELOCITY RATIO: 0.72
DOP CALC AO PEAK VEL: 1.48 M/S
DOP CALC AO VTI: 31.29 CM
DOP CALC LVOT AREA: 2.54 CM2
DOP CALC LVOT CARDIAC INDEX: 2.17 L/MIN/M2
DOP CALC LVOT CARDIAC OUTPUT: 4 L/MIN
DOP CALC LVOT DIAMETER: 1.8 CM
DOP CALC LVOT PEAK VEL VTI: 21.21 CM
DOP CALC LVOT PEAK VEL: 1.06 M/S
DOP CALC LVOT STROKE INDEX: 28.8 ML/M2
DOP CALC LVOT STROKE VOLUME: 53.95 CM3
E WAVE DECELERATION TIME: 145 MS
FRACTIONAL SHORTENING: 38 % (ref 28–44)
INTERVENTRICULAR SEPTUM IN DIASTOLE (PARASTERNAL SHORT AXIS VIEW): 0.8 CM
INTERVENTRICULAR SEPTUM: 0.8 CM (ref 0.6–1.1)
LAAS-AP2: 16.6 CM2
LAAS-AP4: 18 CM2
LEFT ATRIUM SIZE: 3.2 CM
LEFT ATRIUM VOLUME (MOD BIPLANE): 47 ML
LEFT INTERNAL DIMENSION IN SYSTOLE: 2.9 CM (ref 2.1–4)
LEFT VENTRICLE DIASTOLIC VOLUME (MOD BIPLANE): 124 ML
LEFT VENTRICLE SYSTOLIC VOLUME (MOD BIPLANE): 45 ML
LEFT VENTRICULAR INTERNAL DIMENSION IN DIASTOLE: 4.7 CM (ref 3.5–6)
LEFT VENTRICULAR POSTERIOR WALL IN END DIASTOLE: 0.8 CM
LEFT VENTRICULAR STROKE VOLUME: 73 ML
LV EF: 64 %
LVSV (TEICH): 73 ML
MV E'TISSUE VEL-LAT: 17 CM/S
MV E'TISSUE VEL-SEP: 14 CM/S
MV PEAK A VEL: 0.69 M/S
MV PEAK E VEL: 84 CM/S
MV STENOSIS PRESSURE HALF TIME: 42 MS
MV VALVE AREA P 1/2 METHOD: 5.24 CM2
RA PRESSURE ESTIMATED: 10 MMHG
RIGHT ATRIAL 2D VOLUME: 44 ML
RIGHT ATRIUM AREA SYSTOLE A4C: 14.5 CM2
RIGHT VENTRICLE ID DIMENSION: 3.8 CM
RV PSP: 35 MMHG
SL CV LEFT ATRIUM LENGTH A2C: 5.1 CM
SL CV LV EF: 65
SL CV PED ECHO LEFT VENTRICLE DIASTOLIC VOLUME (MOD BIPLANE) 2D: 104 ML
SL CV PED ECHO LEFT VENTRICLE SYSTOLIC VOLUME (MOD BIPLANE) 2D: 31 ML
TR MAX PG: 25 MMHG
TR PEAK VELOCITY: 2.5 M/S
TRICUSPID ANNULAR PLANE SYSTOLIC EXCURSION: 2.7 CM
TRICUSPID VALVE PEAK REGURGITATION VELOCITY: 2.5 M/S

## 2023-08-30 PROCEDURE — 93306 TTE W/DOPPLER COMPLETE: CPT | Performed by: INTERNAL MEDICINE

## 2023-08-30 PROCEDURE — 93306 TTE W/DOPPLER COMPLETE: CPT

## 2023-09-05 ENCOUNTER — HOSPITAL ENCOUNTER (OUTPATIENT)
Dept: NON INVASIVE DIAGNOSTICS | Facility: CLINIC | Age: 28
Discharge: HOME | End: 2023-09-05
Payer: MEDICARE

## 2023-09-05 DIAGNOSIS — R94.31 ABNORMAL ECG: ICD-10-CM

## 2023-09-05 PROCEDURE — 93225 XTRNL ECG REC<48 HRS REC: CPT

## 2023-09-05 PROCEDURE — 93226 XTRNL ECG REC<48 HR SCAN A/R: CPT

## 2023-09-07 ENCOUNTER — OFFICE VISIT (OUTPATIENT)
Dept: OBGYN CLINIC | Facility: CLINIC | Age: 28
End: 2023-09-07

## 2023-09-07 VITALS
HEART RATE: 66 BPM | BODY MASS INDEX: 21.56 KG/M2 | SYSTOLIC BLOOD PRESSURE: 104 MMHG | HEIGHT: 70 IN | DIASTOLIC BLOOD PRESSURE: 68 MMHG | WEIGHT: 150.6 LBS

## 2023-09-07 DIAGNOSIS — Z11.3 SCREENING EXAMINATION FOR STD (SEXUALLY TRANSMITTED DISEASE): Primary | ICD-10-CM

## 2023-09-07 DIAGNOSIS — Z01.419 WOMEN'S ANNUAL ROUTINE GYNECOLOGICAL EXAMINATION: ICD-10-CM

## 2023-09-07 PROCEDURE — G0145 SCR C/V CYTO,THINLAYER,RESCR: HCPCS

## 2023-09-07 PROCEDURE — 87491 CHLMYD TRACH DNA AMP PROBE: CPT

## 2023-09-07 PROCEDURE — 87591 N.GONORRHOEAE DNA AMP PROB: CPT

## 2023-09-07 PROCEDURE — 99385 PREV VISIT NEW AGE 18-39: CPT | Performed by: OBSTETRICS & GYNECOLOGY

## 2023-09-08 NOTE — PROGRESS NOTES
Bakari Lai is a 32 y.o. female who presents for annual well woman exam. Periods are regular every 28-30 days, lasting 4 days. GYN:  · No vaginal discharge, labial erythema or lesions, dyspareunia. · Menses are regular, q 28-30 days, lasting 4-5 days, minimally painful  · Contraception: none, removed IUD 3 months udmas. · Patient is  sexually active with one male partner  · Gynecologic surgery: none    OB:  ·     :  · No dysuria, urinary frequency or urgency. · No hematuria, flank pain, incontinence. Breast:  · No breast mass, skin changes, dimpling, reddening, nipple retraction. · No breast discharge. · Patient does not have a family history of breast, endometrial, or ovarian ca. 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 Now smokes once in a while  · Recreational drug use: smokes marijuana daily    Screening:  · Cervical cancer: never had a pap smear. Done today  · STD screening: ordered today. Review of Systems  Pertinent items are noted in HPI. Objective      /68   Pulse 66   Ht 5' 9.75" (1.772 m)   Wt 68.3 kg (150 lb 9.6 oz)   LMP 2023   BMI 21.76 kg/m²     Physical Exam  HENT:      Head: Normocephalic. Eyes:      Conjunctiva/sclera: Conjunctivae normal.   Cardiovascular:      Rate and Rhythm: Normal rate. Pulses: Normal pulses. Pulmonary:      Effort: Pulmonary effort is normal.   Chest:      Chest wall: No mass, lacerations, deformity, swelling, tenderness, crepitus or edema. There is no dullness to percussion. Breasts: Karl Score is 5. Breasts are symmetrical.      Right: Normal. No swelling, bleeding, inverted nipple, mass, nipple discharge, skin change or tenderness.       Left: Normal. No swelling, bleeding, inverted nipple, mass, nipple discharge, skin change or tenderness. Abdominal:      Palpations: Abdomen is soft. Tenderness: There is no abdominal tenderness. Genitourinary:     General: Normal vulva. Comments: Speculum: normal vaginal and cervical mucosa, normal vaginal discharge  Lymphadenopathy:      Upper Body:      Right upper body: No supraclavicular, axillary or pectoral adenopathy. Left upper body: No supraclavicular, axillary or pectoral adenopathy. Skin:     General: Skin is warm. Neurological:      Mental Status: She is alert and oriented to person, place, and time. Psychiatric:         Mood and Affect: Mood normal.        Assessment     Sharmin Anthony is a 32 y.o.  with normal gynecologic exam.     Plan     -  1. Routine well woman exam done today. 2.  Pap and HPV:Pap with HPV was done today. Current ASCCP Guidelines reviewed. 3.  The patient accepted STD testing. GC/CT, HIV, RPR, Hep B, Hep C testing performed. Safe sex practices have been discussed. 4. The patient is sexually active. She declined contraception as she is not opposed to pregnancy at this time. 5. The following were reviewed in today's visit: STD testing, adequate intake of calcium and vitamin D, exercise, healthy diet, smoking cessation.   6. Patient to return to office in 12 months for annual.     Shantanu Crenshaw MD  PGY-3  2023  9:18 AM

## 2023-09-10 LAB
C TRACH DNA SPEC QL NAA+PROBE: NEGATIVE
N GONORRHOEA DNA SPEC QL NAA+PROBE: NEGATIVE

## 2023-09-12 PROCEDURE — 93227 XTRNL ECG REC<48 HR R&I: CPT | Performed by: INTERNAL MEDICINE

## 2023-09-13 LAB
LAB AP GYN PRIMARY INTERPRETATION: NORMAL
Lab: NORMAL

## 2023-12-05 ENCOUNTER — APPOINTMENT (OUTPATIENT)
Dept: LAB | Age: 28
End: 2023-12-05
Payer: MEDICARE

## 2023-12-05 DIAGNOSIS — Z01.419 WOMEN'S ANNUAL ROUTINE GYNECOLOGICAL EXAMINATION: ICD-10-CM

## 2023-12-05 DIAGNOSIS — Z00.01 ENCOUNTER FOR GENERAL ADULT MEDICAL EXAMINATION WITH ABNORMAL FINDINGS: ICD-10-CM

## 2023-12-05 LAB
25(OH)D3 SERPL-MCNC: 34.5 NG/ML (ref 30–100)
ALBUMIN SERPL BCP-MCNC: 4.6 G/DL (ref 3.5–5)
ALP SERPL-CCNC: 37 U/L (ref 34–104)
ALT SERPL W P-5'-P-CCNC: 19 U/L (ref 7–52)
ANION GAP SERPL CALCULATED.3IONS-SCNC: 7 MMOL/L
AST SERPL W P-5'-P-CCNC: 19 U/L (ref 13–39)
BASOPHILS # BLD AUTO: 0.04 THOUSANDS/ÂΜL (ref 0–0.1)
BASOPHILS NFR BLD AUTO: 1 % (ref 0–1)
BILIRUB SERPL-MCNC: 0.67 MG/DL (ref 0.2–1)
BUN SERPL-MCNC: 13 MG/DL (ref 5–25)
CALCIUM SERPL-MCNC: 9.7 MG/DL (ref 8.4–10.2)
CHLORIDE SERPL-SCNC: 106 MMOL/L (ref 96–108)
CHOLEST SERPL-MCNC: 153 MG/DL
CO2 SERPL-SCNC: 28 MMOL/L (ref 21–32)
CREAT SERPL-MCNC: 0.7 MG/DL (ref 0.6–1.3)
EOSINOPHIL # BLD AUTO: 0.16 THOUSAND/ÂΜL (ref 0–0.61)
EOSINOPHIL NFR BLD AUTO: 4 % (ref 0–6)
ERYTHROCYTE [DISTWIDTH] IN BLOOD BY AUTOMATED COUNT: 13 % (ref 11.6–15.1)
FERRITIN SERPL-MCNC: 80 NG/ML (ref 11–307)
GFR SERPL CREATININE-BSD FRML MDRD: 116 ML/MIN/1.73SQ M
GLUCOSE P FAST SERPL-MCNC: 90 MG/DL (ref 65–99)
HBV SURFACE AG SER QL: NORMAL
HCT VFR BLD AUTO: 40.2 % (ref 34.8–46.1)
HCV AB SER QL: NORMAL
HDLC SERPL-MCNC: 66 MG/DL
HGB BLD-MCNC: 12.9 G/DL (ref 11.5–15.4)
HIV 1+2 AB+HIV1 P24 AG SERPL QL IA: NORMAL
HIV 2 AB SERPL QL IA: NORMAL
HIV1 AB SERPL QL IA: NORMAL
HIV1 P24 AG SERPL QL IA: NORMAL
IMM GRANULOCYTES # BLD AUTO: 0.01 THOUSAND/UL (ref 0–0.2)
IMM GRANULOCYTES NFR BLD AUTO: 0 % (ref 0–2)
LDLC SERPL CALC-MCNC: 80 MG/DL (ref 0–100)
LIPASE SERPL-CCNC: 13 U/L (ref 11–82)
LYMPHOCYTES # BLD AUTO: 1.74 THOUSANDS/ÂΜL (ref 0.6–4.47)
LYMPHOCYTES NFR BLD AUTO: 39 % (ref 14–44)
MCH RBC QN AUTO: 29.5 PG (ref 26.8–34.3)
MCHC RBC AUTO-ENTMCNC: 32.1 G/DL (ref 31.4–37.4)
MCV RBC AUTO: 92 FL (ref 82–98)
MONOCYTES # BLD AUTO: 0.34 THOUSAND/ÂΜL (ref 0.17–1.22)
MONOCYTES NFR BLD AUTO: 8 % (ref 4–12)
NEUTROPHILS # BLD AUTO: 2.2 THOUSANDS/ÂΜL (ref 1.85–7.62)
NEUTS SEG NFR BLD AUTO: 48 % (ref 43–75)
NONHDLC SERPL-MCNC: 87 MG/DL
NRBC BLD AUTO-RTO: 0 /100 WBCS
PLATELET # BLD AUTO: 205 THOUSANDS/UL (ref 149–390)
PMV BLD AUTO: 11 FL (ref 8.9–12.7)
POTASSIUM SERPL-SCNC: 4.2 MMOL/L (ref 3.5–5.3)
PROT SERPL-MCNC: 7.5 G/DL (ref 6.4–8.4)
RBC # BLD AUTO: 4.38 MILLION/UL (ref 3.81–5.12)
SODIUM SERPL-SCNC: 141 MMOL/L (ref 135–147)
T4 FREE SERPL-MCNC: 0.76 NG/DL (ref 0.61–1.12)
TREPONEMA PALLIDUM IGG+IGM AB [PRESENCE] IN SERUM OR PLASMA BY IMMUNOASSAY: NORMAL
TRIGL SERPL-MCNC: 35 MG/DL
TSH SERPL DL<=0.05 MIU/L-ACNC: 0.88 UIU/ML (ref 0.45–4.5)
VIT B12 SERPL-MCNC: 457 PG/ML (ref 180–914)
WBC # BLD AUTO: 4.49 THOUSAND/UL (ref 4.31–10.16)

## 2023-12-05 PROCEDURE — 85025 COMPLETE CBC W/AUTO DIFF WBC: CPT

## 2023-12-05 PROCEDURE — 87340 HEPATITIS B SURFACE AG IA: CPT

## 2023-12-05 PROCEDURE — 80053 COMPREHEN METABOLIC PANEL: CPT

## 2023-12-05 PROCEDURE — 84443 ASSAY THYROID STIM HORMONE: CPT

## 2023-12-05 PROCEDURE — 82728 ASSAY OF FERRITIN: CPT

## 2023-12-05 PROCEDURE — 86780 TREPONEMA PALLIDUM: CPT

## 2023-12-05 PROCEDURE — 82607 VITAMIN B-12: CPT

## 2023-12-05 PROCEDURE — 84439 ASSAY OF FREE THYROXINE: CPT

## 2023-12-05 PROCEDURE — 83690 ASSAY OF LIPASE: CPT

## 2023-12-05 PROCEDURE — 82306 VITAMIN D 25 HYDROXY: CPT

## 2023-12-05 PROCEDURE — 36415 COLL VENOUS BLD VENIPUNCTURE: CPT

## 2023-12-05 PROCEDURE — 87389 HIV-1 AG W/HIV-1&-2 AB AG IA: CPT

## 2023-12-05 PROCEDURE — 80061 LIPID PANEL: CPT

## 2023-12-05 PROCEDURE — 86803 HEPATITIS C AB TEST: CPT

## 2023-12-06 ENCOUNTER — TELEPHONE (OUTPATIENT)
Dept: FAMILY MEDICINE CLINIC | Facility: CLINIC | Age: 28
End: 2023-12-06

## 2023-12-06 NOTE — TELEPHONE ENCOUNTER
----- Message from Faiht Gorman MD sent at 12/5/2023  4:43 PM EST -----  Pt needs appt  ----- Message -----  From: Lab, Background User  Sent: 12/5/2023  11:59 AM EST  To: Faith Gorman MD

## 2023-12-19 ENCOUNTER — OFFICE VISIT (OUTPATIENT)
Dept: FAMILY MEDICINE CLINIC | Facility: CLINIC | Age: 28
End: 2023-12-19
Payer: MEDICARE

## 2023-12-19 VITALS
DIASTOLIC BLOOD PRESSURE: 62 MMHG | OXYGEN SATURATION: 99 % | WEIGHT: 147.4 LBS | RESPIRATION RATE: 16 BRPM | BODY MASS INDEX: 21.1 KG/M2 | SYSTOLIC BLOOD PRESSURE: 100 MMHG | HEART RATE: 64 BPM | TEMPERATURE: 97.6 F | HEIGHT: 70 IN

## 2023-12-19 DIAGNOSIS — R79.89 LOW VITAMIN D LEVEL: ICD-10-CM

## 2023-12-19 DIAGNOSIS — M89.9 LESION OF BONE OF LEFT LOWER LEG: Primary | ICD-10-CM

## 2023-12-19 PROCEDURE — 99214 OFFICE O/P EST MOD 30 MIN: CPT | Performed by: INTERNAL MEDICINE

## 2023-12-19 RX ORDER — ERGOCALCIFEROL 1.25 MG/1
50000 CAPSULE ORAL WEEKLY
Qty: 12 CAPSULE | Refills: 1 | Status: SHIPPED | OUTPATIENT
Start: 2023-12-19

## 2023-12-19 NOTE — PROGRESS NOTES
Name: Holli Mae      : 1995      MRN: [id number]  Encounter Provider: Dmitriy East MD  Encounter Date: 2023   Encounter department: [facility]    Assessment & Plan     1. Lesion of bone of left lower leg  -     MRI tibia fibula right w wo contrast; Future; Expected date: 2023    2. Low vitamin D level  -     ergocalciferol (VITAMIN D2) 50,000 units; Take 1 capsule (50,000 Units total) by mouth once a week  -     Magnesium; Future    RTC in 3 mos w blood work       Subjective      28 Y O Lady is here for Regular check up, she feels ok, except for some change in her lower leg, recent blood work and med list reviewed w pt,...      Review of Systems   Constitutional:  Negative for chills, fatigue and fever.   HENT:  Negative for congestion, facial swelling, sore throat, trouble swallowing and voice change.    Eyes:  Negative for pain, discharge and visual disturbance.   Respiratory:  Negative for cough, shortness of breath and wheezing.    Cardiovascular:  Negative for chest pain, palpitations and leg swelling.   Gastrointestinal:  Negative for abdominal pain, blood in stool, constipation, diarrhea and nausea.   Endocrine: Negative for polydipsia, polyphagia and polyuria.   Genitourinary:  Negative for difficulty urinating, hematuria and urgency.   Musculoskeletal:  Negative for arthralgias and myalgias.   Skin:  Negative for rash.   Neurological:  Negative for dizziness, tremors, weakness and headaches.   Hematological:  Negative for adenopathy. Does not bruise/bleed easily.   Psychiatric/Behavioral:  Negative for dysphoric mood, sleep disturbance and suicidal ideas.        Current Outpatient Medications on File Prior to Visit   Medication Sig    acetaminophen (TYLENOL) 325 mg tablet Take 650 mg by mouth every 6 (six) hours as needed for mild pain (Patient not taking: Reported on 2023)       Objective     /62   Pulse 64   Temp 97.6 °F (36.4  "°C) (Tympanic)   Resp 16   Ht 5' 9.75\" (1.772 m)   Wt 66.9 kg (147 lb 6.4 oz)   SpO2 99%   BMI 21.30 kg/m²     Physical Exam  Constitutional:       General: She is not in acute distress.  HENT:      Head: Normocephalic.      Mouth/Throat:      Pharynx: No oropharyngeal exudate.   Eyes:      General: No scleral icterus.     Conjunctiva/sclera: Conjunctivae normal.      Pupils: Pupils are equal, round, and reactive to light.   Neck:      Thyroid: No thyromegaly.   Cardiovascular:      Rate and Rhythm: Normal rate and regular rhythm.      Heart sounds: Normal heart sounds. No murmur heard.  Pulmonary:      Effort: Pulmonary effort is normal. No respiratory distress.      Breath sounds: Normal breath sounds. No wheezing or rales.   Abdominal:      General: Bowel sounds are normal. There is no distension.      Palpations: Abdomen is soft.      Tenderness: There is no abdominal tenderness. There is no guarding or rebound.   Musculoskeletal:         General: Tenderness present.      Cervical back: Neck supple.   Lymphadenopathy:      Cervical: No cervical adenopathy.   Skin:     Coloration: Skin is not pale.      Findings: No rash.   Neurological:      Mental Status: She is alert and oriented to person, place, and time.      Sensory: No sensory deficit.      Motor: No weakness.       Dmitriy East MD    "

## 2023-12-27 ENCOUNTER — TELEPHONE (OUTPATIENT)
Dept: FAMILY MEDICINE CLINIC | Facility: CLINIC | Age: 28
End: 2023-12-27

## 2023-12-27 DIAGNOSIS — M89.9 LESION OF BONE OF LEFT LOWER LEG: Primary | ICD-10-CM

## 2023-12-27 NOTE — TELEPHONE ENCOUNTER
Regarding MRI Tibia:    Her insurance is requesting a    XRAY TIBIA/LEG to be done first to determine if they will approve MRI.    Please advise.

## 2023-12-30 ENCOUNTER — APPOINTMENT (OUTPATIENT)
Dept: RADIOLOGY | Age: 28
End: 2023-12-30
Payer: MEDICARE

## 2023-12-30 DIAGNOSIS — M89.9 LESION OF BONE OF LEFT LOWER LEG: ICD-10-CM

## 2023-12-30 PROCEDURE — 73590 X-RAY EXAM OF LOWER LEG: CPT

## 2024-01-03 ENCOUNTER — TELEPHONE (OUTPATIENT)
Dept: FAMILY MEDICINE CLINIC | Facility: CLINIC | Age: 29
End: 2024-01-03

## 2024-01-03 NOTE — TELEPHONE ENCOUNTER
----- Message from Dmitriy East MD sent at 12/31/2023  2:01 PM EST -----  X ray is ; OK  ----- Message -----  From: Interface, Radiology Results In  Sent: 12/31/2023   9:52 AM EST  To: Dmitriy East MD

## 2024-01-18 ENCOUNTER — HOSPITAL ENCOUNTER (OUTPATIENT)
Dept: RADIOLOGY | Facility: HOSPITAL | Age: 29
Discharge: HOME | End: 2024-01-18
Payer: MEDICARE

## 2024-01-18 DIAGNOSIS — M89.9 LESION OF BONE OF LEFT LOWER LEG: ICD-10-CM

## 2024-01-18 PROCEDURE — A9585 GADOBUTROL INJECTION: HCPCS | Performed by: INTERNAL MEDICINE

## 2024-01-18 PROCEDURE — G1004 CDSM NDSC: HCPCS

## 2024-01-18 PROCEDURE — 73720 MRI LWR EXTREMITY W/O&W/DYE: CPT

## 2024-01-18 RX ORDER — GADOBUTROL 604.72 MG/ML
6 INJECTION INTRAVENOUS
Status: COMPLETED | OUTPATIENT
Start: 2024-01-18 | End: 2024-01-18

## 2024-01-18 RX ADMIN — GADOBUTROL 6 ML: 604.72 INJECTION INTRAVENOUS at 17:16

## 2024-04-19 ENCOUNTER — TELEPHONE (OUTPATIENT)
Dept: FAMILY MEDICINE CLINIC | Facility: CLINIC | Age: 29
End: 2024-04-19

## 2024-04-19 NOTE — TELEPHONE ENCOUNTER
L/m that patients insurance that we have on file is not active.  She should bring new insurance along if she has it, or she can be self pay.